# Patient Record
Sex: MALE | Race: ASIAN | HISPANIC OR LATINO | Employment: FULL TIME | URBAN - METROPOLITAN AREA
[De-identification: names, ages, dates, MRNs, and addresses within clinical notes are randomized per-mention and may not be internally consistent; named-entity substitution may affect disease eponyms.]

---

## 2017-03-20 ENCOUNTER — TELEPHONE (OUTPATIENT)
Dept: FAMILY MEDICINE | Facility: CLINIC | Age: 42
End: 2017-03-20

## 2017-03-20 ENCOUNTER — DOCUMENTATION ONLY (OUTPATIENT)
Dept: FAMILY MEDICINE | Facility: CLINIC | Age: 42
End: 2017-03-20

## 2017-03-20 NOTE — TELEPHONE ENCOUNTER
Left message that provider is out sick and scheduled appointment will need to be rescheduled. Ask for call back.

## 2017-03-20 NOTE — PROGRESS NOTES
Pre-Visit Chart Review  For Appointment Scheduled on 03/21/2017      Health Maintenance Due   Topic Date Due    TETANUS VACCINE  05/18/1993    Influenza Vaccine  08/01/2016    Lipid Panel  03/10/2017

## 2017-03-21 ENCOUNTER — OFFICE VISIT (OUTPATIENT)
Dept: FAMILY MEDICINE | Facility: CLINIC | Age: 42
End: 2017-03-21
Payer: COMMERCIAL

## 2017-03-21 ENCOUNTER — LAB VISIT (OUTPATIENT)
Dept: LAB | Facility: HOSPITAL | Age: 42
End: 2017-03-21
Attending: FAMILY MEDICINE
Payer: COMMERCIAL

## 2017-03-21 VITALS
WEIGHT: 166.25 LBS | HEART RATE: 71 BPM | DIASTOLIC BLOOD PRESSURE: 77 MMHG | HEIGHT: 71 IN | SYSTOLIC BLOOD PRESSURE: 123 MMHG | TEMPERATURE: 98 F | BODY MASS INDEX: 23.27 KG/M2

## 2017-03-21 DIAGNOSIS — R76.8 HEPATITIS B SURFACE ANTIGEN POSITIVE: Primary | ICD-10-CM

## 2017-03-21 DIAGNOSIS — R76.8 HEPATITIS B SURFACE ANTIGEN POSITIVE: ICD-10-CM

## 2017-03-21 LAB
ALBUMIN SERPL BCP-MCNC: 4.3 G/DL
ALP SERPL-CCNC: 65 U/L
ALT SERPL W/O P-5'-P-CCNC: 23 U/L
ANION GAP SERPL CALC-SCNC: 8 MMOL/L
AST SERPL-CCNC: 23 U/L
BASOPHILS # BLD AUTO: 0.01 K/UL
BASOPHILS NFR BLD: 0.2 %
BILIRUB DIRECT SERPL-MCNC: 0.3 MG/DL
BILIRUB SERPL-MCNC: 1 MG/DL
BUN SERPL-MCNC: 12 MG/DL
CALCIUM SERPL-MCNC: 9.4 MG/DL
CHLORIDE SERPL-SCNC: 105 MMOL/L
CHOLEST/HDLC SERPL: 4.6 {RATIO}
CO2 SERPL-SCNC: 27 MMOL/L
CREAT SERPL-MCNC: 0.9 MG/DL
DIFFERENTIAL METHOD: ABNORMAL
EOSINOPHIL # BLD AUTO: 0 K/UL
EOSINOPHIL NFR BLD: 0.2 %
ERYTHROCYTE [DISTWIDTH] IN BLOOD BY AUTOMATED COUNT: 12.8 %
EST. GFR  (AFRICAN AMERICAN): >60 ML/MIN/1.73 M^2
EST. GFR  (NON AFRICAN AMERICAN): >60 ML/MIN/1.73 M^2
GLUCOSE SERPL-MCNC: 109 MG/DL
HCT VFR BLD AUTO: 41.5 %
HDL/CHOLESTEROL RATIO: 21.9 %
HDLC SERPL-MCNC: 210 MG/DL
HDLC SERPL-MCNC: 46 MG/DL
HGB BLD-MCNC: 13.9 G/DL
HIV 1+2 AB+HIV1 P24 AG SERPL QL IA: NEGATIVE
LDLC SERPL CALC-MCNC: 144.2 MG/DL
LYMPHOCYTES # BLD AUTO: 1.3 K/UL
LYMPHOCYTES NFR BLD: 26.6 %
MCH RBC QN AUTO: 29.3 PG
MCHC RBC AUTO-ENTMCNC: 33.5 %
MCV RBC AUTO: 88 FL
MONOCYTES # BLD AUTO: 0.3 K/UL
MONOCYTES NFR BLD: 6.1 %
NEUTROPHILS # BLD AUTO: 3.3 K/UL
NEUTROPHILS NFR BLD: 66.9 %
NONHDLC SERPL-MCNC: 164 MG/DL
PLATELET # BLD AUTO: 232 K/UL
PMV BLD AUTO: 9.3 FL
POTASSIUM SERPL-SCNC: 4.3 MMOL/L
PROT SERPL-MCNC: 7.7 G/DL
RBC # BLD AUTO: 4.74 M/UL
SODIUM SERPL-SCNC: 140 MMOL/L
TRIGL SERPL-MCNC: 99 MG/DL
WBC # BLD AUTO: 4.93 K/UL

## 2017-03-21 PROCEDURE — 86592 SYPHILIS TEST NON-TREP QUAL: CPT

## 2017-03-21 PROCEDURE — 86382 NEUTRALIZATION TEST VIRAL: CPT

## 2017-03-21 PROCEDURE — 36415 COLL VENOUS BLD VENIPUNCTURE: CPT | Mod: PO

## 2017-03-21 PROCEDURE — 86703 HIV-1/HIV-2 1 RESULT ANTBDY: CPT

## 2017-03-21 PROCEDURE — 80074 ACUTE HEPATITIS PANEL: CPT

## 2017-03-21 PROCEDURE — 85025 COMPLETE CBC W/AUTO DIFF WBC: CPT

## 2017-03-21 PROCEDURE — 80061 LIPID PANEL: CPT

## 2017-03-21 PROCEDURE — 99999 PR PBB SHADOW E&M-EST. PATIENT-LVL III: CPT | Mod: PBBFAC,,, | Performed by: PHYSICIAN ASSISTANT

## 2017-03-21 PROCEDURE — 80048 BASIC METABOLIC PNL TOTAL CA: CPT

## 2017-03-21 PROCEDURE — 99203 OFFICE O/P NEW LOW 30 MIN: CPT | Mod: S$GLB,,, | Performed by: PHYSICIAN ASSISTANT

## 2017-03-21 PROCEDURE — 80076 HEPATIC FUNCTION PANEL: CPT

## 2017-03-21 PROCEDURE — 1160F RVW MEDS BY RX/DR IN RCRD: CPT | Mod: S$GLB,,, | Performed by: PHYSICIAN ASSISTANT

## 2017-03-21 NOTE — PROGRESS NOTES
Subjective:       Patient ID: Mouna Hogan is a 41 y.o. male.    Chief Complaint: Other    HPI Comments: Patient presents for evaluation of recent lab done during a blood donation.  He donated blood in 1/2017.  He received a letter in mail stating screening blood work was positive of hepatitis B surface antigen.  He is unaware of any potential recent exposure to hepatitis B.  He had a blood transfusion about 4-5 years ago after he became acutely anemic from a bleeding stomach ulcer.  He denies IV drug use.  He does not have high risk sexual behavior. He has no chronic medical conditions.  He is feeling well.  He was bone in [facility] but moved to the [place] an infant.      Review of Systems   Constitutional: Negative for appetite change, chills, diaphoresis, fatigue, fever and unexpected weight change.   Cardiovascular: Negative for chest pain and palpitations.   Gastrointestinal: Negative for abdominal distention, abdominal pain, anal bleeding, blood in stool, constipation, diarrhea and vomiting.   Genitourinary: Negative for decreased urine volume and difficulty urinating.   Musculoskeletal: Negative for arthralgias and myalgias.   Skin: Negative for color change, pallor and rash.   Neurological: Negative for dizziness, weakness, light-headedness and headaches.       Objective:      Physical Exam   Constitutional: He appears well-developed and well-nourished. He is cooperative. No distress.   HENT:   Head: Normocephalic and atraumatic.   Mouth/Throat: Oropharynx is clear and moist and mucous membranes are normal.   Eyes: Conjunctivae are normal. No scleral icterus.   Neck: Carotid bruit is not present.   Cardiovascular: Normal rate, regular rhythm and normal heart sounds.    Pulmonary/Chest: Effort normal and breath sounds normal.   Abdominal: Soft. Bowel sounds are normal. There is no tenderness.   Musculoskeletal:        Right lower leg: He exhibits no edema.        Left lower leg: He exhibits no edema.    Neurological: He is alert.   Skin: Skin is warm and dry.   Vitals reviewed.      Assessment:       1. Hepatitis B surface antigen positive        Plan:     Mouna was seen today for other.    Diagnoses and all orders for this visit:    Hepatitis B surface antigen positive  -     HEPATITIS PANEL, ACUTE; Future  -     Hepatitis B surface antigen; Future  -     Basic metabolic panel; Future  -     Hepatic function panel; Future  -     Lipid panel; Future  -     HIV-1 and HIV-2 antibodies; Future  -     Hepatitis C antibody; Future  -     CBC auto differential; Future  -     RPR; Future  -     HEPATITIS B CORE ANTIBODY, IGM; Future    Further recommendations will be made based on results   likely refer to hepatology

## 2017-03-21 NOTE — MR AVS SNAPSHOT
"    [facility]  [address] [address]  Phone: [phone]  Fax: [phone]                  Mouna Hogan   3/21/2017 8:00 AM   Office Visit    Description:  Male : 1975   Provider:  CHONG Castro   Department:  [place]           Reason for Visit     Other           Diagnoses this Visit        Comments    Hepatitis B surface antigen positive    -  Primary            To Do List           Future Appointments        Provider Department Dept Phone    2017 3:40 PM Florentin Harman MD [facility] [phone]      Goals (5 Years of Data)     None      [facility]     [facility] Nurse Care Line -  Assistance  Registered nurses in the [facility] Center provide clinical advisement, health education, appointment booking, and other advisory services.  Call for this free service at [phone].             Medications           Message regarding Medications     Verify the changes and/or additions to your medication regime listed below are the same as discussed with your clinician today.  If any of these changes or additions are incorrect, please notify your healthcare provider.             Verify that the below list of medications is an accurate representation of the medications you are currently taking.  If none reported, the list may be blank. If incorrect, please contact your healthcare provider. Carry this list with you in case of emergency.           Current Medications     acetaminophen (TYLENOL EXTRA STRENGTH) 500 MG tablet PRN           Clinical Reference Information           Your Vitals Were     BP Pulse Temp Height Weight BMI    123/77 (BP Location: Right arm, Patient Position: Sitting, BP Method: Automatic) 71 98.3 °F (36.8 °C) (Oral) 5' 11" (1.803 m) 75.4 kg (166 lb 3.6 oz) 23.18 kg/m2      Blood Pressure          Most Recent Value    BP  123/77      Allergies as of 3/21/2017     No Known Drug Allergies    "   Immunizations Administered on Date of Encounter - 3/21/2017     None      Orders Placed During Today's Visit     Future Labs/Procedures Expected by Expires    Basic metabolic panel  3/21/2017 5/20/2018    CBC auto differential  3/21/2017 3/21/2018    Hepatic function panel  3/21/2017 5/20/2018    HEPATITIS B CORE ANTIBODY, IGM  3/21/2017 5/20/2018    Hepatitis B surface antigen  3/21/2017 5/20/2018    Hepatitis C antibody  3/21/2017 5/20/2018    HEPATITIS PANEL, ACUTE  3/21/2017 5/20/2018    HIV-1 and HIV-2 antibodies  3/21/2017 5/20/2018    Lipid panel  3/21/2017 5/20/2018    RPR  3/21/2017 5/20/2018      Language Assistance Services     ATTENTION: Language assistance services are available, free of charge. Please call [phone].      ATENCIÓN: Si habla español, tiene a henry disposición servicios gratuitos de asistencia lingüística. Llame al [phone].     CHÚ Ý: N?u b?n nói Ti?ng Vi?t, có các d?ch v? h? tr? ngôn ng? mi?n phí dành cho b?n. G?i s? [phone].         [facility] complies with applicable Federal civil rights laws and does not discriminate on the basis of race, color, national origin, age, disability, or sex.

## 2017-03-22 ENCOUNTER — TELEPHONE (OUTPATIENT)
Dept: FAMILY MEDICINE | Facility: CLINIC | Age: 42
End: 2017-03-22

## 2017-03-22 DIAGNOSIS — R76.8 HEPATITIS B SURFACE ANTIGEN POSITIVE: Primary | ICD-10-CM

## 2017-03-22 LAB
HAV IGM SERPL QL IA: NEGATIVE
HBSAG CONFIRMATION: POSITIVE
HBV CORE IGM SERPL QL IA: NEGATIVE
HBV CORE IGM SERPL QL IA: NEGATIVE
HBV SURFACE AG SERPL QL IA: POSITIVE
HBV SURFACE AG SERPL QL IA: POSITIVE
HCV AB SERPL QL IA: NEGATIVE
HCV AB SERPL QL IA: NEGATIVE
RPR SER QL: NORMAL

## 2017-03-22 NOTE — TELEPHONE ENCOUNTER
Please let patient know that all lab is in acceptable range except the hepatitis B surface antigen is again positive   You can let him know that the antibody level is not elevated indicating probable chronic hep b infection   i am referring him to hepatology for further evaluation

## 2017-03-23 ENCOUNTER — DOCUMENTATION ONLY (OUTPATIENT)
Dept: TRANSPLANT | Facility: CLINIC | Age: 42
End: 2017-03-23

## 2017-03-23 NOTE — NURSING
Pt records reviewed.   Pt will be referred to Hepatology.  Hepatitis B surface antigen positive  Initial referral received  from the [facility].   Referring Provider/diagnosis  CHONG Castro  Referral letter sent to provider and patient.

## 2017-03-23 NOTE — LETTER
March 23, 2017    CHONG Castro  [address] [facility] [address]      Dear Dr. Anna    Patient: Mouna Hogan   MR Number: [id number]   YOB: 1975     Thank you for the referral of Mouna Hogan to the [facility]. An initial appointment will be scheduled for your patient with one of our Hepatologists.      Thank you again for your trust in our program.  If there is anything we can do for you or your staff, please feel free to contact us.        Sincerely,        [facility]  [address] [place] [address]  [phone]

## 2017-03-23 NOTE — TELEPHONE ENCOUNTER
MA spoke with pt's wife, requesting appt in [place] or [place]. Explained to wife no available appt. She stated her  is not having symptoms so they're willing to wait. Pt name put on waiting list for main campus also.

## 2017-03-23 NOTE — LETTER
March 23, 2017    Mouna Hogan  [address]  [place] [address]      Dear Mouna Hogan:    Your doctor has referred you to the [facility]. You will be contacted by our office and an initial appointment will then be scheduled for you.    We look forward to seeing you soon. If you have any further questions, please contact us at [phone].       Sincerely,        [facility]   [facility] [place] [address]  [phone]

## 2017-03-23 NOTE — TELEPHONE ENCOUNTER
Please schedule appointment on [facility] for patient diagnosed with Hepatits B. He has been notified and is waiting for call.

## 2017-03-23 NOTE — TELEPHONE ENCOUNTER
----- Message from Elida Shah sent at 3/23/2017  8:37 AM CDT -----  Contact: pt, # [phone]  Returning call, Results  Call back on #[phone]  thanks

## 2017-03-27 ENCOUNTER — TELEPHONE (OUTPATIENT)
Dept: HEPATOLOGY | Facility: CLINIC | Age: 42
End: 2017-03-27

## 2017-03-27 NOTE — TELEPHONE ENCOUNTER
----- Message from Sapphire Nunez sent at 3/23/2017  1:00 PM CDT -----  Want's Dr. Alvarado, N/S only, states someone has already spoken with him about this appt.  ----- Message -----     From: Aliza Royal LPN     Sent: 3/23/2017   7:54 AM       To: Hepatology Scheduling    Pt records reviewed.   Pt will be referred to Hepatology.  Hepatitis B surface antigen positive  [facility]   Referring Provider/diagnosis  CHONG Castro  Referral letter sent to provider and patient.

## 2017-03-28 ENCOUNTER — TELEPHONE (OUTPATIENT)
Dept: HEPATOLOGY | Facility: CLINIC | Age: 42
End: 2017-03-28

## 2017-03-28 NOTE — TELEPHONE ENCOUNTER
MA spoke with patient, name on waiting list waiting for schedule to open. Pt verbalized understanding.

## 2017-03-28 NOTE — TELEPHONE ENCOUNTER
----- Message from Janiya Philippe sent at 3/28/2017  9:14 AM CDT -----  Contact: [place]rum [place]  Message     Appointment Request From: Mouna Hogan        With Provider: Other - (see comments) [oth]        Would Accept With:Only the person I've selected        Preferred Date Range: From 5/8/2017 To 5/31/2017        Preferred Times: Any        Reason for visit: Request an Appt        Comments:    Hi I have a referral to the liver department.  Can I see Dr Alvarado at the Main [place] when her May schedules are available?  Thank you.  You can contact me at [phone] or cell [phone].        Mouna Hogan

## 2017-04-10 ENCOUNTER — TELEPHONE (OUTPATIENT)
Dept: HEPATOLOGY | Facility: CLINIC | Age: 42
End: 2017-04-10

## 2017-04-10 NOTE — TELEPHONE ENCOUNTER
Schedule open for MAY (waiting list).     MA spoke with pt's wife, Appt schedule 05/23 @ 1020am. Patient address verified, appt letter mailed to patient.

## 2017-05-12 ENCOUNTER — TELEPHONE (OUTPATIENT)
Dept: FAMILY MEDICINE | Facility: CLINIC | Age: 42
End: 2017-05-12

## 2017-05-12 NOTE — TELEPHONE ENCOUNTER
Left message that provider will be out of the clinic on day of appointment and will need to call back to reschedule appointment

## 2017-05-23 ENCOUNTER — OFFICE VISIT (OUTPATIENT)
Dept: HEPATOLOGY | Facility: CLINIC | Age: 42
End: 2017-05-23
Payer: COMMERCIAL

## 2017-05-23 ENCOUNTER — HOSPITAL ENCOUNTER (OUTPATIENT)
Dept: RADIOLOGY | Facility: CLINIC | Age: 42
Discharge: HOME | End: 2017-05-23
Attending: INTERNAL MEDICINE
Payer: COMMERCIAL

## 2017-05-23 ENCOUNTER — LAB VISIT (OUTPATIENT)
Dept: LAB | Facility: HOSPITAL | Age: 42
End: 2017-05-23
Attending: INTERNAL MEDICINE
Payer: COMMERCIAL

## 2017-05-23 VITALS
SYSTOLIC BLOOD PRESSURE: 128 MMHG | WEIGHT: 165.38 LBS | HEIGHT: 71 IN | OXYGEN SATURATION: 99 % | DIASTOLIC BLOOD PRESSURE: 88 MMHG | RESPIRATION RATE: 18 BRPM | TEMPERATURE: 98 F | HEART RATE: 66 BPM | BODY MASS INDEX: 23.15 KG/M2

## 2017-05-23 DIAGNOSIS — B18.1 CHRONIC HEPATITIS B: ICD-10-CM

## 2017-05-23 LAB
AFP SERPL-MCNC: 3.4 NG/ML
HBV CORE AB SERPL QL IA: POSITIVE
HEPATITIS A ANTIBODY, IGG: POSITIVE

## 2017-05-23 PROCEDURE — 86707 HEPATITIS BE ANTIBODY: CPT

## 2017-05-23 PROCEDURE — 36415 COLL VENOUS BLD VENIPUNCTURE: CPT

## 2017-05-23 PROCEDURE — 76700 US EXAM ABDOM COMPLETE: CPT | Mod: 26,,, | Performed by: RADIOLOGY

## 2017-05-23 PROCEDURE — 86704 HEP B CORE ANTIBODY TOTAL: CPT

## 2017-05-23 PROCEDURE — 86790 VIRUS ANTIBODY NOS: CPT

## 2017-05-23 PROCEDURE — 82105 ALPHA-FETOPROTEIN SERUM: CPT

## 2017-05-23 PROCEDURE — 99244 OFF/OP CNSLTJ NEW/EST MOD 40: CPT | Mod: S$GLB,,, | Performed by: INTERNAL MEDICINE

## 2017-05-23 PROCEDURE — 99999 PR PBB SHADOW E&M-EST. PATIENT-LVL III: CPT | Mod: PBBFAC,,, | Performed by: INTERNAL MEDICINE

## 2017-05-23 PROCEDURE — 76700 US EXAM ABDOM COMPLETE: CPT | Mod: TC,PO

## 2017-05-23 PROCEDURE — 87517 HEPATITIS B DNA QUANT: CPT

## 2017-05-23 PROCEDURE — 87350 HEPATITIS BE AG IA: CPT

## 2017-05-23 NOTE — Clinical Note
Recommendations: -  Labs today:   HAVAb, HBcAb total, HBV DNA PCR quant, HBeAg, HBeAb  AFP today and every year.  -  U/s abd now and every year -  Return in 3 months

## 2017-05-23 NOTE — LETTER
May 23, 2017      Florentin Harman MD  [address]  [facility] [address]           [facility]  [address]  [facility] [address]  Phone: [phone]  Fax: [phone]          Patient: Mouna Hogan   MR Number: [id number]   YOB: 1975   Date of Visit: 5/23/2017       Dear Dr. Florentin Harman:    Thank you for referring Mouna Hogan to me for evaluation. Attached you will find relevant portions of my assessment and plan of care.    If you have questions, please do not hesitate to call me. I look forward to following Mouna Hogan along with you.    Sincerely,    Griselda Alvarado MD    Enclosure  CC:  No Recipients    If you would like to receive this communication electronically, please contact [email or web address] or [phone] to request more information on [facility] [employer] access.    For providers and/or their staff who would like to refer a patient to [facility], please contact us through our one-stop-shop provider referral line, [facility], at [phone].    If you feel you have received this communication in error or would no longer like to receive these types of communications, please e-mail [email or web address]

## 2017-05-23 NOTE — PROGRESS NOTES
[facility] Consultation Note    Reason for Consult:  The encounter diagnosis was Chronic hepatitis B.    PCP: Angela Anna   [address] [place] [address]    HPI:  This is a 42 y.o. male here for evaluation of:  HBsAg positive, HBcore Ab negative. No HBV DNA available. Transaminases normal 23 each, all LFTs normal. Creatinine normal.  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      Just found out of HBsAg positive when donated blood in approx. 2017.  Had HBcAb IgM negative, HAVAb IgM neg, HCV Ab negative.      Patient does not know if mom has hep B, dad is  from brain aneurysm.  Has 3 sisters and 2 brothers from the same mother, not known if any of them have hep B positive result.      Completely asymptomatic.     Elevated liver enzymes: No  Abnormal imaging: None available  Cirrhosis: No  Hepatitis C: No  Hepatitis B: Yes  Fatty liver: No  Encephalopathy: No  Post-hospital discharge: No  Symptoms: none    Primary hepatic manifestations:  Fatigue:No  Edema:No  Ascites:No  Encephalopathy:No  Abdominal pain:No  GI bleeds: No  Pruritus:No  Weight Changes:No  Changes in Bowel habits: No  Muscle cramps:No    Risk factors for liver disease:  No jaundice  No transfusions  No IVDU  Did not snort cocaine or similar agents  Did not live with anyone with hepatitis B or C  Sexual partner not tested  No hepatotoxic medications  No exposure to industrial toxins  Alcohol: None      ROS:  Constitutional: No fevers, chills, weight changes, fatigue  ENT: No allergies, nosebleeds,   CV: No chest pain  Pulm: No cough, shortness of breath  Ophtho: No vision changes  GI/Liver: see HPI  Derm: No rash, itching  Heme: No swollen glands, bruising  MSK: No joint pains, joint swelling  : No dysuria, hematuria, decrease in urine output  Endo: No hot or cold intolerance  Neuro: No confusion, disorientation, difficulty with sleep, memory, concentration, syncope, seizure  Psych:  "No anxiety, depression    Medical History:  has a past medical history of H pylori ulcer.    Surgical History:  has a past surgical history that includes Lymph node dissection (1997).    Family History: family history is not on file..     Social History:  reports that he has never smoked. He has never used smokeless tobacco. He reports that he drinks alcohol. He reports that he does not use drugs.    Review of patient's allergies indicates:   Allergen Reactions    No known drug allergies        Current Outpatient Prescriptions   Medication Sig    acetaminophen (TYLENOL EXTRA STRENGTH) 500 MG tablet PRN     No current facility-administered medications for this visit.        Objective Findings:    Vital Signs:  /88 (BP Location: Left arm, Patient Position: Sitting)   Pulse 66   Temp 98 °F (36.7 °C) (Oral)   Resp 18   Ht 5' 11" (1.803 m)   Wt 75 kg (165 lb 5.5 oz)   SpO2 99%   BMI 23.06 kg/m²   Body mass index is 23.06 kg/m².    Physical Exam:  General Appearance: Well appearing in no acute distress  Head:   Normocephalic, without obvious abnormality  Eyes:    No scleral icterus, EOMI  ENT: Neck supple, Lips, mucosa, and tongue normal; teeth and gums normal  Lungs: CTA bilaterally in anterior and posterior fields, no wheezes, no crackles.  Heart:  Regular rate and rhythm, S1, S2 normal, no murmurs heard  Abdomen: Soft, non tender, non distended with positive bowel sounds in all four quadrants. No hepatosplenomegaly, ascites, or mass  Extremities: 2+ pulses, no clubbing, cyanosis or edema  Skin: No rash  Neurologic: CN II-XII intact, alert, oriented x 3. No asterixis      Labs:  Lab Results   Component Value Date    WBC 4.93 03/21/2017    HGB 13.9 (L) 03/21/2017    HCT 41.5 03/21/2017     03/21/2017    CHOL 210 (H) 03/21/2017    TRIG 99 03/21/2017    HDL 46 03/21/2017    CREATININE 0.9 03/21/2017    BUN 12 03/21/2017    BILITOT 1.0 03/21/2017    ALT 23 03/21/2017    AST 23 03/21/2017    ALKPHOS 65 " 03/21/2017     03/21/2017    K 4.3 03/21/2017     03/21/2017    CO2 27 03/21/2017    TSH 1.00 10/11/2010       Imaging:   none    Endoscopy:    None    Assessment:  1. Chronic hepatitis B    HBsAg positive, need HBV DNA and e antigen status checked        Recommendations:  -  Labs today:    HAVAb, HBcAb total, HBV DNA PCR quant, HBeAg, HBeAb   AFP today and every year.   -  U/s abd now and every year  -  Return in 3 months      Return in about 3 months (around 8/23/2017).      Order summary:  Orders Placed This Encounter   Procedures    US Abdomen Complete    AFP tumor marker    Hepatitis B core antibody, total    Hepatitis B DNA, Quant    Hepatitis B e antigen    Hepatitis B e antibody    Hepatitis A antibody, IgG       Thank you so much for allowing me to participate in the care of Mouna Samira Alvarado MD

## 2017-05-26 ENCOUNTER — TELEPHONE (OUTPATIENT)
Dept: HEPATOLOGY | Facility: CLINIC | Age: 42
End: 2017-05-26

## 2017-05-26 ENCOUNTER — PATIENT MESSAGE (OUTPATIENT)
Dept: TRANSPLANT | Facility: CLINIC | Age: 42
End: 2017-05-26

## 2017-05-26 DIAGNOSIS — R16.0 LIVER MASS: ICD-10-CM

## 2017-05-26 LAB
HBV DNA SERPL NAA+PROBE-ACNC: 3.36 LOGIU/ML
HBV DNA SERPL NAA+PROBE-LOG IU: 4340 IU/ML
HBV E AB SER QL: ABNORMAL
HBV E AG SPEC QL: NORMAL

## 2017-05-26 NOTE — TELEPHONE ENCOUNTER
Pl inform patient ultrasound showed a mass in the liver 3.3 cm in size, needs further evaluation with MRI.  Tumor marker was normal on 5/23/17.  Order placed, pl schedule ASAP. Thanks

## 2017-05-26 NOTE — TELEPHONE ENCOUNTER
MA called patient, able to speak to his wife inform wife of patient US results, she schedule his MRI in [place] to further evaluate his liver wife accepted 5/29/17 mailed appt reminder to patient.KEYANNA

## 2017-05-26 NOTE — TELEPHONE ENCOUNTER
----- Message from Griselda Alvarado MD sent at 5/26/2017  7:11 AM CDT -----  Immune to hep A, therefore, does not need immunization against hep A. Tumor marker is normal, but ultrasound showed a mass in the liver, therefore, will need MRI for evaluation. Order placed, pl schedule.

## 2017-05-29 ENCOUNTER — HOSPITAL ENCOUNTER (OUTPATIENT)
Dept: RADIOLOGY | Facility: HOSPITAL | Age: 42
Discharge: HOME | End: 2017-05-29
Attending: INTERNAL MEDICINE
Payer: COMMERCIAL

## 2017-05-29 ENCOUNTER — PATIENT MESSAGE (OUTPATIENT)
Dept: TRANSPLANT | Facility: CLINIC | Age: 42
End: 2017-05-29

## 2017-05-29 DIAGNOSIS — R16.0 LIVER MASS: ICD-10-CM

## 2017-05-29 DIAGNOSIS — B18.1 CHRONIC HEPATITIS B: Primary | ICD-10-CM

## 2017-05-29 PROCEDURE — A9585 GADOBUTROL INJECTION: HCPCS | Performed by: INTERNAL MEDICINE

## 2017-05-29 PROCEDURE — 74183 MRI ABD W/O CNTR FLWD CNTR: CPT | Mod: TC

## 2017-05-29 PROCEDURE — 74183 MRI ABD W/O CNTR FLWD CNTR: CPT | Mod: 26,,, | Performed by: RADIOLOGY

## 2017-05-29 PROCEDURE — 25500020 PHARM REV CODE 255: Performed by: INTERNAL MEDICINE

## 2017-05-29 RX ORDER — GADOBUTROL 604.72 MG/ML
7 INJECTION INTRAVENOUS
Status: COMPLETED | OUTPATIENT
Start: 2017-05-29 | End: 2017-05-29

## 2017-05-29 RX ORDER — GADOBUTROL 604.72 MG/ML
INJECTION INTRAVENOUS
Status: DISPENSED
Start: 2017-05-29 | End: 2017-05-29

## 2017-05-29 RX ADMIN — GADOBUTROL 7 ML: 604.72 INJECTION INTRAVENOUS at 07:05

## 2017-06-30 ENCOUNTER — DOCUMENTATION ONLY (OUTPATIENT)
Dept: FAMILY MEDICINE | Facility: CLINIC | Age: 42
End: 2017-06-30

## 2017-06-30 NOTE — PROGRESS NOTES
Pre-Visit Chart Review  For Appointment Scheduled on 07/03/2017    Health Maintenance Due   Topic Date Due    TETANUS VACCINE  05/18/1993

## 2017-07-03 ENCOUNTER — LAB VISIT (OUTPATIENT)
Dept: LAB | Facility: HOSPITAL | Age: 42
End: 2017-07-03
Attending: FAMILY MEDICINE
Payer: COMMERCIAL

## 2017-07-03 ENCOUNTER — OFFICE VISIT (OUTPATIENT)
Dept: FAMILY MEDICINE | Facility: CLINIC | Age: 42
End: 2017-07-03
Payer: COMMERCIAL

## 2017-07-03 VITALS
BODY MASS INDEX: 23.52 KG/M2 | HEART RATE: 91 BPM | HEIGHT: 71 IN | SYSTOLIC BLOOD PRESSURE: 136 MMHG | TEMPERATURE: 98 F | DIASTOLIC BLOOD PRESSURE: 73 MMHG | WEIGHT: 168 LBS

## 2017-07-03 DIAGNOSIS — Z23 IMMUNIZATION DUE: ICD-10-CM

## 2017-07-03 DIAGNOSIS — B18.1 CHRONIC HEPATITIS B: ICD-10-CM

## 2017-07-03 DIAGNOSIS — B18.1 CHRONIC HEPATITIS B: Primary | ICD-10-CM

## 2017-07-03 DIAGNOSIS — Z29.9 PREVENTIVE MEASURE: ICD-10-CM

## 2017-07-03 LAB
25(OH)D3+25(OH)D2 SERPL-MCNC: 38 NG/ML
ALBUMIN SERPL BCP-MCNC: 4 G/DL
ALP SERPL-CCNC: 74 U/L
ALT SERPL W/O P-5'-P-CCNC: 30 U/L
ANION GAP SERPL CALC-SCNC: 6 MMOL/L
AST SERPL-CCNC: 21 U/L
BILIRUB SERPL-MCNC: 0.5 MG/DL
BUN SERPL-MCNC: 15 MG/DL
CALCIUM SERPL-MCNC: 9.5 MG/DL
CHLORIDE SERPL-SCNC: 104 MMOL/L
CO2 SERPL-SCNC: 29 MMOL/L
CREAT SERPL-MCNC: 0.9 MG/DL
EST. GFR  (AFRICAN AMERICAN): >60 ML/MIN/1.73 M^2
EST. GFR  (NON AFRICAN AMERICAN): >60 ML/MIN/1.73 M^2
ESTIMATED AVG GLUCOSE: 114 MG/DL
GLUCOSE SERPL-MCNC: 100 MG/DL
HBA1C MFR BLD HPLC: 5.6 %
POTASSIUM SERPL-SCNC: 3.8 MMOL/L
PROT SERPL-MCNC: 7.6 G/DL
SODIUM SERPL-SCNC: 139 MMOL/L
TSH SERPL DL<=0.005 MIU/L-ACNC: 1.06 UIU/ML

## 2017-07-03 PROCEDURE — 99999 PR PBB SHADOW E&M-EST. PATIENT-LVL III: CPT | Mod: PBBFAC,,, | Performed by: FAMILY MEDICINE

## 2017-07-03 PROCEDURE — 90471 IMMUNIZATION ADMIN: CPT | Mod: S$GLB,,, | Performed by: FAMILY MEDICINE

## 2017-07-03 PROCEDURE — 83036 HEMOGLOBIN GLYCOSYLATED A1C: CPT

## 2017-07-03 PROCEDURE — 80053 COMPREHEN METABOLIC PANEL: CPT

## 2017-07-03 PROCEDURE — 90715 TDAP VACCINE 7 YRS/> IM: CPT | Mod: S$GLB,,, | Performed by: FAMILY MEDICINE

## 2017-07-03 PROCEDURE — 82306 VITAMIN D 25 HYDROXY: CPT

## 2017-07-03 PROCEDURE — 36415 COLL VENOUS BLD VENIPUNCTURE: CPT | Mod: PO

## 2017-07-03 PROCEDURE — 84443 ASSAY THYROID STIM HORMONE: CPT

## 2017-07-03 PROCEDURE — 99214 OFFICE O/P EST MOD 30 MIN: CPT | Mod: 25,S$GLB,, | Performed by: FAMILY MEDICINE

## 2017-07-03 NOTE — PROGRESS NOTES
Two Patient identifiers used, Name and . VIS information given to patient and procedure was explained. Patient verbalized understanding. Tdap administered IM in the right deltoid using sterile technique.  No residual bleeding noted. Patient tolerated procedure well.

## 2017-07-03 NOTE — PROGRESS NOTES
"[facility]  Progress Note    Subjective:       Patient ID: Mouna Garcia is a 42 y.o. male.    Chief Complaint: Establish Care    HPI42 y.o.male with current medical history of hepatitis B is here today to establish care.  Patient is under care of hepatology.  Patient was found to have hepatitis B when attempting to donate blood.  Patient does not have any other medical problems.  Patient does not have any acute complaints at today's visit.  Review of Systems   Constitutional: Negative for chills and fever.   HENT: Negative for congestion.    Eyes: Negative for pain.   Respiratory: Negative for shortness of breath and wheezing.    Cardiovascular: Negative for chest pain, palpitations and leg swelling.   Gastrointestinal: Negative for abdominal pain, nausea and vomiting.   Genitourinary: Negative for difficulty urinating.   Musculoskeletal: Negative for arthralgias, gait problem and myalgias.   Skin: Negative for rash.   Neurological: Negative for seizures.       Objective:      Vitals:    07/03/17 1302   BP: 136/73   BP Location: Right arm   Patient Position: Sitting   BP Method: Automatic   Pulse: 91   Temp: 98.4 °F (36.9 °C)   TempSrc: Oral   Weight: 76.2 kg (167 lb 15.9 oz)   Height: 5' 11" (1.803 m)     Body mass index is 23.43 kg/m².  Physical Exam   Constitutional: He is oriented to person, place, and time. He appears well-developed and well-nourished.   HENT:   Head: Normocephalic and atraumatic.   Eyes: Conjunctivae and EOM are normal. Pupils are equal, round, and reactive to light.   Neck: Normal range of motion. Neck supple. No JVD present.   Cardiovascular: Normal rate, regular rhythm, normal heart sounds and intact distal pulses.  Exam reveals no gallop and no friction rub.    No murmur heard.  Pulmonary/Chest: Effort normal and breath sounds normal. No respiratory distress. He has no wheezes.   Abdominal: Soft. Bowel sounds are normal. There is no tenderness.   Musculoskeletal: Normal range " of motion.   Neurological: He is alert and oriented to person, place, and time. No cranial nerve deficit.   Skin: Skin is warm and dry.   Psychiatric: He has a normal mood and affect. His behavior is normal. Judgment and thought content normal.   Nursing note and vitals reviewed.      Assessment:       1. Chronic hepatitis B    2. Preventive measure    3. Immunization due        Plan:       Chronic hepatitis B  -     Comprehensive metabolic panel; Future; Expected date: 07/03/2017  -      Follow up with hepatology   -  Avoid hepatotoxic agents     Preventive measure  -     Hemoglobin A1c; Future; Expected date: 07/03/2017  -     Vitamin D; Future; Expected date: 07/03/2017  -     TSH; Future; Expected date: 07/03/2017    Immunization due  -     Tdap Vaccine      Return in about 6 months (around 1/3/2018).  Florentin Harman MD  [facility]  7/3/2017 1:28 PM

## 2017-08-04 ENCOUNTER — PATIENT MESSAGE (OUTPATIENT)
Dept: HEPATOLOGY | Facility: CLINIC | Age: 42
End: 2017-08-04

## 2017-08-04 ENCOUNTER — TELEPHONE (OUTPATIENT)
Dept: HEPATOLOGY | Facility: CLINIC | Age: 42
End: 2017-08-04

## 2017-08-04 NOTE — TELEPHONE ENCOUNTER
----- Message from Angela Collier sent at 8/4/2017  3:12 PM CDT -----  Contact: patient  Patient calling in regards to receiving his recall letter for a 3 mo f/u. He wants to schedule his f/u in [place]. No avail appt. Leno advise.   Call back   Thanks!

## 2017-08-04 NOTE — TELEPHONE ENCOUNTER
Spoke with pt next available in [place] is November 2017.   Pt decline, will wait until November    I offered pt video visit, that he is interested in. I sent pt the consent to sign so that we can schedule the appt.     Pt name also added to wait list for November appt.

## 2017-08-10 ENCOUNTER — PATIENT MESSAGE (OUTPATIENT)
Dept: HEPATOLOGY | Facility: CLINIC | Age: 42
End: 2017-08-10

## 2017-08-11 ENCOUNTER — OFFICE VISIT (OUTPATIENT)
Dept: HEPATOLOGY | Facility: CLINIC | Age: 42
End: 2017-08-11
Payer: COMMERCIAL

## 2017-08-11 DIAGNOSIS — R93.2 ABNORMAL LIVER DIAGNOSTIC IMAGING: Primary | ICD-10-CM

## 2017-08-11 DIAGNOSIS — B18.1 CHRONIC HEPATITIS B: ICD-10-CM

## 2017-08-11 DIAGNOSIS — R16.0 LIVER MASS: Primary | ICD-10-CM

## 2017-08-11 DIAGNOSIS — D18.03 LIVER HEMANGIOMA: ICD-10-CM

## 2017-08-11 PROCEDURE — 99444 PR PHYSICIAN ONLINE EVALUATION & MANAGEMENT SERVICE: CPT | Mod: CSM,,, | Performed by: INTERNAL MEDICINE

## 2017-08-11 PROCEDURE — 99999 PR PBB SHADOW E&M-EST. PATIENT-LVL II: CPT | Mod: PBBFAC,,, | Performed by: INTERNAL MEDICINE

## 2017-08-11 PROCEDURE — 99999 PR PBB SHADOW E&M-EST. PATIENT-LVL III: CPT | Mod: PBBFAC,,, | Performed by: INTERNAL MEDICINE

## 2017-08-11 PROCEDURE — 99499 UNLISTED E&M SERVICE: CPT | Mod: S$GLB,,, | Performed by: INTERNAL MEDICINE

## 2017-08-11 NOTE — Clinical Note
Recommendations: -  AFP, CMP, HBV DNA quant and MRI w/wo contrast at the end Aug 2017 -  AFP, HBV DNA quant and CMP q 6 months -  MRI in Aug 2018 -  Return in 6 months - [facility]

## 2017-08-11 NOTE — PROGRESS NOTES
[facility] Consultation Note    Reason for Consult:  The primary encounter diagnosis was Liver mass. Diagnoses of Liver hemangioma and Chronic hepatitis B were also pertinent to this visit.    PCP: Florentin Harman       Interval history:  Liver mass found on u/s abd and follow-up MRI confirmed mass, as below, during first-time evaluation of chronic hep B.      Us 17:  The liver is normal in size and contour.  Within the left hepatic lobe, likely segment 4B, there is a 3.3 cm mixed echogenicity mass.  No additional hepatic lesions are identified.  The main portal vein demonstrates normal directional flow.    MRI 17:  Posteriorly in the medial segment of the left lobe of the liver corresponding to the ultrasound findings there is 2.7 x 2.7 x 1.9 cm mass typical of a hemangioma.  It's signal is that of blood pool, and there is a bright peripheral nodular enhancement on the early arterial phase.  Liver is otherwise unremarkable in appearance and spleen, pancreas, adrenal glands, kidneys show no abnormality.      AFP 3.4    Chronic hep B is HBeAg negative, HBeAb positive, with HBV DNA of 4,340 IU/mL on 17.  Thus, this is pre-core mutant/core-promoter mutation hep B.  Will need life-long monitoring for flares and HCC.     Patient remains asymptomatic.      HPI:  This is a 42 y.o. male here for evaluation of:  HBsAg positive, HBcore Ab negative. No HBV DNA available. Transaminases normal 23 each, all LFTs normal. Creatinine normal.  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      Just found out of HBsAg positive when donated blood in approx. 2017.  Had HBcAb IgM negative, HAVAb IgM neg, HCV Ab negative.      Patient does not know if mom has hep B, dad is  from brain aneurysm.  Has 3 sisters and 2 brothers from the same mother, not known if any of them have hep B positive result.      Completely asymptomatic.     Elevated liver enzymes: No  Abnormal imaging:  liver mass, consistent with hemangioma.  Cirrhosis: No  Hepatitis C: No  Hepatitis B: Yes  Fatty liver: No  Encephalopathy: No  Post-hospital discharge: No  Symptoms: none    Primary hepatic manifestations:  Fatigue:No  Edema:No  Ascites:No  Encephalopathy:No  Abdominal pain:No  GI bleeds: No  Pruritus:No  Weight Changes:No  Changes in Bowel habits: No  Muscle cramps:No    Risk factors for liver disease:  No jaundice  No transfusions  No IVDU  Did not snort cocaine or similar agents  Did not live with anyone with hepatitis B or C  Sexual partner not tested  No hepatotoxic medications  No exposure to industrial toxins  Alcohol: None      ROS:  Constitutional: No fevers, chills, weight changes, fatigue  ENT: No allergies, nosebleeds,   CV: No chest pain  Pulm: No cough, shortness of breath  Ophtho: No vision changes  GI/Liver: see HPI  Derm: No rash, itching  Heme: No swollen glands, bruising  MSK: No joint pains, joint swelling  : No dysuria, hematuria, decrease in urine output  Endo: No hot or cold intolerance  Neuro: No confusion, disorientation, difficulty with sleep, memory, concentration, syncope, seizure  Psych: No anxiety, depression    Medical History:  has a past medical history of H pylori ulcer.    Surgical History:  has a past surgical history that includes Lymph node dissection (1997).    Family History: family history is not on file..     Social History:  reports that he has never smoked. He has never used smokeless tobacco. He reports that he drinks alcohol. He reports that he does not use drugs.    Review of patient's allergies indicates:   Allergen Reactions    No known drug allergies        Current Outpatient Prescriptions   Medication Sig    acetaminophen (TYLENOL EXTRA STRENGTH) 500 MG tablet PRN    MULTIVITAMIN ORAL Take by mouth Daily.     No current facility-administered medications for this visit.        Objective Findings:    Vital Signs: due to Video Visit, no vitals can be taken for  this visit.   There were no vitals taken for this visit.  There is no height or weight on file to calculate BMI.    Physical Exam:  General Appearance: Well appearing in no acute distress  Head:   Normocephalic, without obvious abnormality  Eyes:    No scleral icterus, EOMI  ENT: not done due to video visit.   Lungs: not done due to video visit.  Heart:  not done due to video visit.  Abdomen: Based on patient's self exam, abd is Soft, non tender, non distended.   Extremities: no cyanosis, and (based on patient's self-exam) no edema  Skin: No rash  Neurologic: alert, oriented x 3. No asterixis      Labs:  Lab Results   Component Value Date    WBC 4.93 03/21/2017    HGB 13.9 (L) 03/21/2017    HCT 41.5 03/21/2017     03/21/2017    CHOL 210 (H) 03/21/2017    TRIG 99 03/21/2017    HDL 46 03/21/2017    CREATININE 0.9 07/03/2017    BUN 15 07/03/2017    BILITOT 0.5 07/03/2017    ALT 30 07/03/2017    AST 21 07/03/2017    ALKPHOS 74 07/03/2017     07/03/2017    K 3.8 07/03/2017     07/03/2017    CO2 29 07/03/2017    TSH 1.060 07/03/2017    HGBA1C 5.6 07/03/2017    AFP 3.4 05/23/2017       Imaging:   Us 5/23/17:  The liver is normal in size and contour.  Within the left hepatic lobe, likely segment 4B, there is a 3.3 cm mixed echogenicity mass.  No additional hepatic lesions are identified.  The main portal vein demonstrates normal directional flow.    MRI 5/29/17:  Posteriorly in the medial segment of the left lobe of the liver corresponding to the ultrasound findings there is 2.7 x 2.7 x 1.9 cm mass typical of a hemangioma.  It's signal is that of blood pool, and there is a bright peripheral nodular enhancement on the early arterial phase.  Liver is otherwise unremarkable in appearance and spleen, pancreas, adrenal glands, kidneys show no abnormality.      Endoscopy:    None    Assessment:  1. Liver mass    2. Liver hemangioma    3. Chronic hepatitis B       -  Liver lesion left lobe, 2.7 x 2.7 x 1.9 cm  mass typical of a hemangioma.  Because this mass small in size, it is most likely benign.  Will monitor with MRI at 3 months (now) from initial diagnosis in May 2017.  Then 1 yr later, then if stable, every 2-3 yrs.     -  Chronic hep B is HBeAg negative, HBeAb positive, with HBV DNA of 4,340 IU/mL on 5/23/17.  Thus, this is pre-core mutant/core-promoter mutation hep B.  Currently, although HBV DNA >2000 IU/mL, transaminases completely normal, therefore, will not start anti-hep B antiviral therapy.  But will do life-long monitoring for flares of hepatitis with CMP and HBV DNA q 6 months.     -  HCC Surveillance:  Due to chronic hep B and being male of  origin, will need life-long monitoring for HCC, starting age 40, currently 42.  Will get AFP every 6 months and either U/S or MRI every year.         Recommendations:  -  AFP, CMP, HBV DNA quant and MRI w/wo contrast at the end Aug 2017  -  AFP, HBV DNA quant and CMP q 6 months  -  MRI in Aug 2018  -  Return in 6 months - VidyoVisit    Return in about 6 months (around 2/11/2018), or Video Visit next time. Patient requested it.  .      Order summary:  Orders Placed This Encounter   Procedures    US Abdomen Limited    MRI Abdomen W WO Contrast    Comprehensive metabolic panel    AFP tumor marker    Hepatitis B DNA, Quant       Thank you so much for allowing me to participate in the care of Mouna Alysha Alvarado MD

## 2017-08-12 ENCOUNTER — PATIENT MESSAGE (OUTPATIENT)
Dept: HEPATOLOGY | Facility: CLINIC | Age: 42
End: 2017-08-12

## 2017-08-14 ENCOUNTER — PATIENT MESSAGE (OUTPATIENT)
Dept: HEPATOLOGY | Facility: CLINIC | Age: 42
End: 2017-08-14

## 2017-09-11 ENCOUNTER — LAB VISIT (OUTPATIENT)
Dept: LAB | Facility: HOSPITAL | Age: 42
End: 2017-09-11
Attending: INTERNAL MEDICINE
Payer: COMMERCIAL

## 2017-09-11 DIAGNOSIS — D18.03 LIVER HEMANGIOMA: ICD-10-CM

## 2017-09-11 DIAGNOSIS — R16.0 LIVER MASS: ICD-10-CM

## 2017-09-11 DIAGNOSIS — B18.1 CHRONIC HEPATITIS B: ICD-10-CM

## 2017-09-11 PROCEDURE — 82105 ALPHA-FETOPROTEIN SERUM: CPT

## 2017-09-11 PROCEDURE — 36415 COLL VENOUS BLD VENIPUNCTURE: CPT | Mod: PO

## 2017-09-11 PROCEDURE — 87517 HEPATITIS B DNA QUANT: CPT

## 2017-09-12 LAB — AFP SERPL-MCNC: 3.2 NG/ML

## 2017-09-14 ENCOUNTER — TELEPHONE (OUTPATIENT)
Dept: HEPATOLOGY | Facility: CLINIC | Age: 42
End: 2017-09-14

## 2017-09-14 NOTE — TELEPHONE ENCOUNTER
----- Message from Griselda Alvarado MD sent at 9/13/2017 11:52 PM CDT -----  Please inform patient results are OK.

## 2017-09-15 LAB
HBV DNA SERPL NAA+PROBE-ACNC: 3.58 LOGIU/ML
HBV DNA SERPL NAA+PROBE-LOG IU: 3882 IU/ML

## 2017-09-16 ENCOUNTER — HOSPITAL ENCOUNTER (OUTPATIENT)
Dept: RADIOLOGY | Facility: HOSPITAL | Age: 42
Discharge: HOME | End: 2017-09-16
Attending: INTERNAL MEDICINE
Payer: COMMERCIAL

## 2017-09-16 DIAGNOSIS — D18.03 LIVER HEMANGIOMA: ICD-10-CM

## 2017-09-16 DIAGNOSIS — R16.0 LIVER MASS: ICD-10-CM

## 2017-09-16 DIAGNOSIS — B18.1 CHRONIC HEPATITIS B: ICD-10-CM

## 2017-09-16 PROCEDURE — A9585 GADOBUTROL INJECTION: HCPCS | Performed by: INTERNAL MEDICINE

## 2017-09-16 PROCEDURE — 74183 MRI ABD W/O CNTR FLWD CNTR: CPT | Mod: TC

## 2017-09-16 PROCEDURE — 74183 MRI ABD W/O CNTR FLWD CNTR: CPT | Mod: 26,,, | Performed by: RADIOLOGY

## 2017-09-16 PROCEDURE — 76700 US EXAM ABDOM COMPLETE: CPT | Mod: TC

## 2017-09-16 PROCEDURE — 25500020 PHARM REV CODE 255: Performed by: INTERNAL MEDICINE

## 2017-09-16 PROCEDURE — 76700 US EXAM ABDOM COMPLETE: CPT | Mod: 26,,, | Performed by: RADIOLOGY

## 2017-09-16 RX ORDER — GADOBUTROL 604.72 MG/ML
7 INJECTION INTRAVENOUS
Status: COMPLETED | OUTPATIENT
Start: 2017-09-16 | End: 2017-09-16

## 2017-09-16 RX ORDER — GADOBUTROL 604.72 MG/ML
INJECTION INTRAVENOUS
Status: DISPENSED
Start: 2017-09-16 | End: 2017-09-16

## 2017-09-16 RX ADMIN — GADOBUTROL 7 ML: 604.72 INJECTION INTRAVENOUS at 09:09

## 2017-09-18 ENCOUNTER — TELEPHONE (OUTPATIENT)
Dept: HEPATOLOGY | Facility: CLINIC | Age: 42
End: 2017-09-18

## 2017-09-18 NOTE — TELEPHONE ENCOUNTER
----- Message from Griselda Alvarado MD sent at 9/16/2017  5:55 AM CDT -----  HBV DNA is a little lower than previous. Liver enzymes were normal. Therefore, will not treat hep B at this time. Continue to monitor CMP, HBV DNA quant and AFP every 6 months. Next due in March 2018.

## 2017-09-20 ENCOUNTER — TELEPHONE (OUTPATIENT)
Dept: HEPATOLOGY | Facility: CLINIC | Age: 42
End: 2017-09-20

## 2017-09-20 NOTE — TELEPHONE ENCOUNTER
----- Message from Fanny Cotter RN sent at 9/20/2017  9:08 AM CDT -----      ----- Message -----  From: Griselda Alvarado MD  Sent: 9/19/2017   9:40 PM  To: [facility]    Pl inform patient:  Ultrasound abd showed:  Gall bladder polyps unchanged. Liver lesion stable.

## 2017-09-20 NOTE — TELEPHONE ENCOUNTER
----- Message from Griselda Alvarado MD sent at 9/19/2017 10:46 PM CDT -----  Pl inform patient:  MRI showed hemangioma same size as before.

## 2017-12-29 ENCOUNTER — DOCUMENTATION ONLY (OUTPATIENT)
Dept: FAMILY MEDICINE | Facility: CLINIC | Age: 42
End: 2017-12-29

## 2018-01-02 ENCOUNTER — TELEPHONE (OUTPATIENT)
Dept: HEPATOLOGY | Facility: CLINIC | Age: 43
End: 2018-01-02

## 2018-01-02 NOTE — TELEPHONE ENCOUNTER
MA called patient to schedule his appt he is requesting Video visit on 2/19 at 10:00 am. Inform patient that I will ask Dr. Alvarado and I will let him know.

## 2018-01-02 NOTE — TELEPHONE ENCOUNTER
----- Message from Brynn Capps sent at 12/28/2017  2:09 PM CST -----  Contact: patient   Caity    Jane would like schedule a appt with Dr. Alvarado in [place].    Thanks,  Brynn  ----- Message -----  From: Sung Melgar  Sent: 12/26/2017   1:25 PM  To: Hepatology Scheduling    Patient called to schedule an appointment.         Please call [phone]          Thanks!

## 2018-01-03 ENCOUNTER — PATIENT MESSAGE (OUTPATIENT)
Dept: HEPATOLOGY | Facility: CLINIC | Age: 43
End: 2018-01-03

## 2018-01-03 ENCOUNTER — OFFICE VISIT (OUTPATIENT)
Dept: FAMILY MEDICINE | Facility: CLINIC | Age: 43
End: 2018-01-03
Payer: COMMERCIAL

## 2018-01-03 VITALS
WEIGHT: 169.31 LBS | HEIGHT: 71 IN | TEMPERATURE: 98 F | BODY MASS INDEX: 23.7 KG/M2 | DIASTOLIC BLOOD PRESSURE: 73 MMHG | HEART RATE: 84 BPM | SYSTOLIC BLOOD PRESSURE: 125 MMHG

## 2018-01-03 DIAGNOSIS — Z00.00 WELLNESS EXAMINATION: Primary | ICD-10-CM

## 2018-01-03 DIAGNOSIS — B18.1 CHRONIC HEPATITIS B: ICD-10-CM

## 2018-01-03 DIAGNOSIS — D18.03 LIVER HEMANGIOMA: ICD-10-CM

## 2018-01-03 PROCEDURE — 99999 PR PBB SHADOW E&M-EST. PATIENT-LVL III: CPT | Mod: PBBFAC,,, | Performed by: FAMILY MEDICINE

## 2018-01-03 PROCEDURE — 99396 PREV VISIT EST AGE 40-64: CPT | Mod: S$GLB,,, | Performed by: FAMILY MEDICINE

## 2018-01-16 PROBLEM — D18.03 LIVER HEMANGIOMA: Status: ACTIVE | Noted: 2017-05-26

## 2018-01-16 NOTE — PROGRESS NOTES
"[facility]  Progress Note    Subjective:       Patient ID: Mouna Garcia is a 42 y.o. male.    Chief Complaint: Follow-up    HPI42 y.o.male is here today for hepatitis B follow-up visit.  Patient has been evaluated by hepatology.  They are monitoring his levels.  Patient does not have any other significant medical history.  No acute complaints at today's visit.  Review of Systems   Constitutional: Negative for chills and fever.   HENT: Negative for congestion.    Eyes: Negative for pain.   Respiratory: Negative for shortness of breath and wheezing.    Cardiovascular: Negative for chest pain, palpitations and leg swelling.   Gastrointestinal: Negative for abdominal pain, nausea and vomiting.   Genitourinary: Negative for difficulty urinating.   Musculoskeletal: Negative for arthralgias, gait problem and myalgias.   Skin: Negative for rash.   Neurological: Negative for seizures.       Objective:      Vitals:    01/03/18 1354   BP: 125/73   BP Location: Right arm   Patient Position: Sitting   BP Method: Large (Automatic)   Pulse: 84   Temp: 98.2 °F (36.8 °C)   TempSrc: Oral   Weight: 76.8 kg (169 lb 5 oz)   Height: 5' 11" (1.803 m)     Body mass index is 23.61 kg/m².  Physical Exam   Constitutional: He is oriented to person, place, and time. He appears well-developed and well-nourished.   HENT:   Head: Normocephalic and atraumatic.   Eyes: Conjunctivae and EOM are normal. Pupils are equal, round, and reactive to light.   Neck: Normal range of motion. Neck supple. No JVD present.   Cardiovascular: Normal rate, regular rhythm, normal heart sounds and intact distal pulses.  Exam reveals no gallop and no friction rub.    No murmur heard.  Pulmonary/Chest: Effort normal and breath sounds normal. No respiratory distress. He has no wheezes.   Abdominal: Soft. Bowel sounds are normal. There is no tenderness.   Musculoskeletal: Normal range of motion.   Neurological: He is alert and oriented to person, place, and " time. No cranial nerve deficit.   Skin: Skin is warm and dry.   Psychiatric: He has a normal mood and affect. His behavior is normal. Judgment and thought content normal.   Nursing note and vitals reviewed.      Assessment:       1. Wellness examination    2. Chronic hepatitis B    3. Liver hemangioma        Plan:       Wellness examination  -     CBC auto differential; Future; Expected date: 01/03/2018  -     Lipid panel; Future; Expected date: 01/03/2018  -     TSH; Future; Expected date: 01/03/2018  -     Vitamin D; Future; Expected date: 01/03/2018  -     Hemoglobin A1c; Future; Expected date: 01/03/2018    Chronic hepatitis B  -     Comprehensive metabolic panel; Future; Expected date: 01/03/2018        - Stable follow up hepatology     Liver hemangioma        - Stable follow up hepatology     Follow-up in about 6 months (around 7/3/2018).  Florentin Harman MD  [facility]  1/16/2018 9:52 AM

## 2018-02-19 ENCOUNTER — OFFICE VISIT (OUTPATIENT)
Dept: HEPATOLOGY | Facility: CLINIC | Age: 43
End: 2018-02-19
Payer: COMMERCIAL

## 2018-02-19 DIAGNOSIS — D18.03 LIVER HEMANGIOMA: ICD-10-CM

## 2018-02-19 DIAGNOSIS — B18.1 CHRONIC HEPATITIS B: Primary | ICD-10-CM

## 2018-02-19 PROCEDURE — 99999 PR PBB SHADOW E&M-EST. PATIENT-LVL II: CPT | Mod: PBBFAC,,, | Performed by: INTERNAL MEDICINE

## 2018-02-19 PROCEDURE — 99444 PR PHYSICIAN ONLINE EVALUATION & MANAGEMENT SERVICE: CPT | Mod: CSM,,, | Performed by: INTERNAL MEDICINE

## 2018-02-19 NOTE — PROGRESS NOTES
[facility] Consultation Note    Reason for Consult:  The primary encounter diagnosis was Chronic hepatitis B. A diagnosis of Liver hemangioma was also pertinent to this visit.    PCP: Florentin Harman       Interval history:  Liver mass found on u/s abd and follow-up MRI confirmed mass to be hemangioma, as below, during first-time evaluation of chronic hep B. Follow-up mRI in 9/16/17 showed no change in size, 2.5 cm, in left lobe of the liver.      Labs have also shown no change, low level hep B viremia, 3000 -4000 IU/mL.  LFTs normal.  AFP remains normal.     Patient feels fine.  Has no swelling or pain.     Us 5/23/17:  The liver is normal in size and contour.  Within the left hepatic lobe, likely segment 4B, there is a 3.3 cm mixed echogenicity mass.  No additional hepatic lesions are identified.  The main portal vein demonstrates normal directional flow.    MRI 5/29/17:  Posteriorly in the medial segment of the left lobe of the liver corresponding to the ultrasound findings there is 2.7 x 2.7 x 1.9 cm mass typical of a hemangioma.  It's signal is that of blood pool, and there is a bright peripheral nodular enhancement on the early arterial phase.  Liver is otherwise unremarkable in appearance and spleen, pancreas, adrenal glands, kidneys show no abnormality.      MRI 9/16/17:   The liver is normal in size and contour.  Within segment 4A, a lobulated 2.5 cm moderately T2 hyperintense mass is again identified.  The mass demonstrates peripheral nodular enhancement, with progressive centripetal filling on later phase imaging which persists into the delayed phase.  Findings are compatible with a hemangioma.    AFP 3.2, HBV DNA 3882 IU/mL. AST and ALT normal 21, 30.     Chronic hep B is HBeAg negative, HBeAb positive, with HBV DNA of 4,340 IU/mL on 5/23/17.  Thus, this is pre-core mutant/core-promoter mutation hep B.  Will need life-long monitoring for flares and HCC.     Patient remains  asymptomatic.      HPI:  This is a 42 y.o. male here for evaluation of:  HBsAg positive, HBcore Ab negative. No HBV DNA available. Transaminases normal 23 each, all LFTs normal. Creatinine normal.  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      Just found out of HBsAg positive when donated blood in approx. 2017.  Had HBcAb IgM negative, HAVAb IgM neg, HCV Ab negative.      Patient does not know if mom has hep B, dad is  from brain aneurysm.  Has 3 sisters and 2 brothers from the same mother, not known if any of them have hep B positive result.      Completely asymptomatic.     Elevated liver enzymes: No  Abnormal imaging: liver mass, consistent with hemangioma.  Cirrhosis: No  Hepatitis C: No  Hepatitis B: Yes  Fatty liver: No  Encephalopathy: No  Post-hospital discharge: No  Symptoms: none    Primary hepatic manifestations:  Fatigue:No  Edema:No  Ascites:No  Encephalopathy:No  Abdominal pain:No  GI bleeds: No  Pruritus:No  Weight Changes:No  Changes in Bowel habits: No  Muscle cramps:No    Risk factors for liver disease:  No jaundice  No transfusions  No IVDU  Did not snort cocaine or similar agents  Did not live with anyone with hepatitis B or C  Sexual partner not tested  No hepatotoxic medications  No exposure to industrial toxins  Alcohol: None      ROS:  Constitutional: No fevers, chills, weight changes, fatigue  ENT: No allergies, nosebleeds,   CV: No chest pain  Pulm: No cough, shortness of breath  Ophtho: No vision changes  GI/Liver: see HPI  Derm: No rash, itching  Heme: No swollen glands, bruising  MSK: No joint pains, joint swelling  : No dysuria, hematuria, decrease in urine output  Endo: No hot or cold intolerance  Neuro: No confusion, disorientation, difficulty with sleep, memory, concentration, syncope, seizure  Psych: No anxiety, depression    Medical History:  has a past medical history of H pylori ulcer.    Surgical History:  has a past surgical history that  includes Lymph node dissection (1997).    Family History: family history is not on file..     Social History:  reports that he has never smoked. He has never used smokeless tobacco. He reports that he drinks alcohol. He reports that he does not use drugs.    Review of patient's allergies indicates:   Allergen Reactions    No known drug allergies        Current Outpatient Prescriptions   Medication Sig    MULTIVITAMIN ORAL Take by mouth Daily.    acetaminophen (TYLENOL EXTRA STRENGTH) 500 MG tablet PRN     No current facility-administered medications for this visit.        Objective Findings:    Vital Signs: due to Video Visit, no vitals can be taken for this visit.   There were no vitals taken for this visit.  There is no height or weight on file to calculate BMI.    Physical Exam:  General Appearance: Well appearing in no acute distress  Head:   Normocephalic, without obvious abnormality  Eyes:    No scleral icterus, EOMI  ENT: not done due to video visit.   Lungs: not done due to video visit.  Heart:  not done due to video visit.  Abdomen: Based on patient's self exam, abd is Soft, non tender, non distended.   Extremities: no cyanosis, and (based on patient's self-exam) no edema  Skin: No rash  Neurologic: alert, oriented x 3. No asterixis      Labs:  Lab Results   Component Value Date    WBC 4.93 03/21/2017    HGB 13.9 (L) 03/21/2017    HCT 41.5 03/21/2017     03/21/2017    CHOL 210 (H) 03/21/2017    TRIG 99 03/21/2017    HDL 46 03/21/2017    CREATININE 0.9 07/03/2017    BUN 15 07/03/2017    BILITOT 0.5 07/03/2017    ALT 30 07/03/2017    AST 21 07/03/2017    ALKPHOS 74 07/03/2017     07/03/2017    K 3.8 07/03/2017     07/03/2017    CO2 29 07/03/2017    TSH 1.060 07/03/2017    HGBA1C 5.6 07/03/2017    AFP 3.2 09/11/2017       Imaging:   Us 5/23/17:  The liver is normal in size and contour.  Within the left hepatic lobe, likely segment 4B, there is a 3.3 cm mixed echogenicity mass.  No  additional hepatic lesions are identified.  The main portal vein demonstrates normal directional flow.    MRI 5/29/17:  Posteriorly in the medial segment of the left lobe of the liver corresponding to the ultrasound findings there is 2.7 x 2.7 x 1.9 cm mass typical of a hemangioma.  It's signal is that of blood pool, and there is a bright peripheral nodular enhancement on the early arterial phase.  Liver is otherwise unremarkable in appearance and spleen, pancreas, adrenal glands, kidneys show no abnormality.      Endoscopy:    None    Assessment:  1. Chronic hepatitis B    2. Liver hemangioma       -  Liver lesion left lobe, 2.5 cm mass typical of a hemangioma.  Because this mass remains nchange, will monitor with u/s abd every 6 months.  Due to Chr hep B in a  male, we would do u/s monitoring every 6 months when age is >40, hence the next u/s will be in March 2018.      -  Chronic hep B is HBeAg negative, HBeAb positive, with HBV DNA of 4,340 IU/mL on 5/23/17.  Thus, this is pre-core mutant/core-promoter mutation hep B.  Currently, although HBV DNA >2000 IU/mL, transaminases completely normal, therefore, will not start anti-hep B antiviral therapy.  But will do life-long monitoring for flares of hepatitis with CMP and HBV DNA q 6 months.     -  HCC Surveillance:  Due to chronic hep B and being male of  origin, will need life-long monitoring for HCC, starting age 40, currently 42.  Will get AFP every 6 months and U/S every 6 months, and mRI if change oted in the liver mass or new lesions appear.           Recommendations:  -  AFP, HBV DNA quant and U/S abd in March 2018.   -  AFP, HBV DNA quant, CMP, U/S abd limited q 6 months starting Sept 2018.   -  Return in 6 months - VidyoVisit    Follow-up in about 6 months (around 8/19/2018).      Order summary:  No orders of the defined types were placed in this encounter.      Thank you so much for allowing me to participate in the care of Mouna Encarnacion  Samira Alvarado MD

## 2018-02-19 NOTE — PATIENT INSTRUCTIONS
Recommendations:  -  AFP, HBV DNA quant and U/S abd in March 2018.   -  AFP, HBV DNA quant, CMP, U/S abd limited q 6 months starting Sept 2018.   -  Return in 6 months - VidyoVisit

## 2018-04-02 ENCOUNTER — LAB VISIT (OUTPATIENT)
Dept: LAB | Facility: HOSPITAL | Age: 43
End: 2018-04-02
Attending: FAMILY MEDICINE
Payer: COMMERCIAL

## 2018-04-02 DIAGNOSIS — B18.1 CHRONIC HEPATITIS B: ICD-10-CM

## 2018-04-02 DIAGNOSIS — Z00.00 WELLNESS EXAMINATION: ICD-10-CM

## 2018-04-02 LAB
25(OH)D3+25(OH)D2 SERPL-MCNC: 38 NG/ML
ALBUMIN SERPL BCP-MCNC: 4 G/DL
ALP SERPL-CCNC: 87 U/L
ALT SERPL W/O P-5'-P-CCNC: 30 U/L
ANION GAP SERPL CALC-SCNC: 6 MMOL/L
AST SERPL-CCNC: 28 U/L
BASOPHILS # BLD AUTO: 0.03 K/UL
BASOPHILS NFR BLD: 0.6 %
BILIRUB SERPL-MCNC: 0.5 MG/DL
BUN SERPL-MCNC: 16 MG/DL
CALCIUM SERPL-MCNC: 9.4 MG/DL
CHLORIDE SERPL-SCNC: 106 MMOL/L
CHOLEST SERPL-MCNC: 228 MG/DL
CHOLEST/HDLC SERPL: 3.8 {RATIO}
CO2 SERPL-SCNC: 27 MMOL/L
CREAT SERPL-MCNC: 0.8 MG/DL
DIFFERENTIAL METHOD: NORMAL
EOSINOPHIL # BLD AUTO: 0.1 K/UL
EOSINOPHIL NFR BLD: 1.5 %
ERYTHROCYTE [DISTWIDTH] IN BLOOD BY AUTOMATED COUNT: 13 %
EST. GFR  (AFRICAN AMERICAN): >60 ML/MIN/1.73 M^2
EST. GFR  (NON AFRICAN AMERICAN): >60 ML/MIN/1.73 M^2
ESTIMATED AVG GLUCOSE: 108 MG/DL
GLUCOSE SERPL-MCNC: 107 MG/DL
HBA1C MFR BLD HPLC: 5.4 %
HCT VFR BLD AUTO: 43.8 %
HDLC SERPL-MCNC: 60 MG/DL
HDLC SERPL: 26.3 %
HGB BLD-MCNC: 14.2 G/DL
IMM GRANULOCYTES # BLD AUTO: 0.01 K/UL
IMM GRANULOCYTES NFR BLD AUTO: 0.2 %
LDLC SERPL CALC-MCNC: 142.4 MG/DL
LYMPHOCYTES # BLD AUTO: 2 K/UL
LYMPHOCYTES NFR BLD: 36.9 %
MCH RBC QN AUTO: 29.2 PG
MCHC RBC AUTO-ENTMCNC: 32.4 G/DL
MCV RBC AUTO: 90 FL
MONOCYTES # BLD AUTO: 0.3 K/UL
MONOCYTES NFR BLD: 5.9 %
NEUTROPHILS # BLD AUTO: 3 K/UL
NEUTROPHILS NFR BLD: 54.9 %
NONHDLC SERPL-MCNC: 168 MG/DL
NRBC BLD-RTO: 0 /100 WBC
PLATELET # BLD AUTO: 202 K/UL
PMV BLD AUTO: 9.8 FL
POTASSIUM SERPL-SCNC: 4.3 MMOL/L
PROT SERPL-MCNC: 7.6 G/DL
RBC # BLD AUTO: 4.86 M/UL
SODIUM SERPL-SCNC: 139 MMOL/L
TRIGL SERPL-MCNC: 128 MG/DL
TSH SERPL DL<=0.005 MIU/L-ACNC: 1.42 UIU/ML
WBC # BLD AUTO: 5.42 K/UL

## 2018-04-02 PROCEDURE — 36415 COLL VENOUS BLD VENIPUNCTURE: CPT | Mod: PO

## 2018-04-02 PROCEDURE — 82306 VITAMIN D 25 HYDROXY: CPT

## 2018-04-02 PROCEDURE — 80053 COMPREHEN METABOLIC PANEL: CPT

## 2018-04-02 PROCEDURE — 85025 COMPLETE CBC W/AUTO DIFF WBC: CPT

## 2018-04-02 PROCEDURE — 84443 ASSAY THYROID STIM HORMONE: CPT

## 2018-04-02 PROCEDURE — 80061 LIPID PANEL: CPT

## 2018-04-02 PROCEDURE — 83036 HEMOGLOBIN GLYCOSYLATED A1C: CPT

## 2018-04-06 ENCOUNTER — OFFICE VISIT (OUTPATIENT)
Dept: FAMILY MEDICINE | Facility: CLINIC | Age: 43
End: 2018-04-06
Payer: COMMERCIAL

## 2018-04-06 VITALS
BODY MASS INDEX: 22.75 KG/M2 | HEART RATE: 60 BPM | WEIGHT: 162.5 LBS | DIASTOLIC BLOOD PRESSURE: 73 MMHG | SYSTOLIC BLOOD PRESSURE: 127 MMHG | HEIGHT: 71 IN | TEMPERATURE: 98 F

## 2018-04-06 DIAGNOSIS — B18.1 CHRONIC HEPATITIS B: Primary | ICD-10-CM

## 2018-04-06 DIAGNOSIS — D18.03 LIVER HEMANGIOMA: ICD-10-CM

## 2018-04-06 DIAGNOSIS — E78.00 PURE HYPERCHOLESTEROLEMIA: ICD-10-CM

## 2018-04-06 PROCEDURE — 99999 PR PBB SHADOW E&M-EST. PATIENT-LVL III: CPT | Mod: PBBFAC,,, | Performed by: FAMILY MEDICINE

## 2018-04-06 PROCEDURE — 99214 OFFICE O/P EST MOD 30 MIN: CPT | Mod: S$GLB,,, | Performed by: FAMILY MEDICINE

## 2018-04-06 NOTE — PROGRESS NOTES
"[facility]  Progress Note    Subjective:       Patient ID: Mouna Garcia is a 42 y.o. male.    Chief Complaint: lab follow up / HLD    HPI42 y.o.male is here today to follow-up abnormal labs.  Patient had annual labs completed.  Recent labs indicated hyperlipidemia.  Patient does not meet current criteria to start statin therapy.  All other medical conditions are currently stable.  No acute complaints at today's visit.  Review of Systems   Constitutional: Negative for chills, fatigue and fever.   HENT: Negative for congestion, ear pain, postnasal drip, sinus pressure, sneezing and sore throat.    Eyes: Negative for pain.   Respiratory: Negative for cough, shortness of breath and wheezing.    Cardiovascular: Negative for chest pain, palpitations and leg swelling.   Gastrointestinal: Negative for abdominal distention, abdominal pain, constipation, diarrhea, nausea and vomiting.   Genitourinary: Negative for difficulty urinating.   Musculoskeletal: Negative for back pain and myalgias.   Skin: Negative for rash.   Neurological: Negative for dizziness, seizures, syncope, weakness and numbness.   Psychiatric/Behavioral: Negative for sleep disturbance. The patient is not nervous/anxious.        Objective:      Vitals:    04/06/18 1405   BP: 127/73   BP Location: Right arm   Patient Position: Sitting   BP Method: Medium (Automatic)   Pulse: 60   Temp: 98.1 °F (36.7 °C)   TempSrc: Oral   Weight: 73.7 kg (162 lb 7.7 oz)   Height: 5' 11" (1.803 m)     Body mass index is 22.66 kg/m².  Physical Exam   Constitutional: He is oriented to person, place, and time. He appears well-developed and well-nourished. No distress.   HENT:   Head: Normocephalic and atraumatic.   Cardiovascular: Normal rate, regular rhythm, normal heart sounds and intact distal pulses.  Exam reveals no gallop and no friction rub.    No murmur heard.  Pulmonary/Chest: Effort normal and breath sounds normal. No respiratory distress. He has no rales. "   Abdominal: Soft. He exhibits no distension and no mass. There is no rebound and no guarding. No hernia.   Musculoskeletal: Normal range of motion.   Neurological: He is alert and oriented to person, place, and time.   Skin: Skin is warm.   Psychiatric: He has a normal mood and affect. His behavior is normal. Judgment and thought content normal.   Vitals reviewed.      Assessment:       1. Chronic hepatitis B    2. Liver hemangioma    3. Pure hypercholesterolemia        Plan:       Chronic hepatitis B        -  Stable continue to monitor         - Follow up with hepatology   Liver hemangioma        -  Stable continue to monitor         - Follow up with hepatology  Pure hypercholesterolemia        - Patient educated on diet and exercise       Follow-up in about 6 months (around 10/6/2018).  Florentin Harman MD  [facility]  4/6/2018 2:43 PM

## 2018-07-24 ENCOUNTER — TELEPHONE (OUTPATIENT)
Dept: HEPATOLOGY | Facility: CLINIC | Age: 43
End: 2018-07-24

## 2018-07-24 DIAGNOSIS — B18.1 CHRONIC HEPATITIS B WITHOUT DELTA AGENT WITHOUT HEPATIC COMA: Primary | ICD-10-CM

## 2018-07-24 DIAGNOSIS — D18.03 LIVER HEMANGIOMA: ICD-10-CM

## 2018-07-24 NOTE — TELEPHONE ENCOUNTER
----- Message from Devon Swift sent at 7/24/2018 10:16 AM CDT -----  Type: Needs Medical Advice    Who Called:  Patient  Best Call Back Number: [phone]  Additional Information: Patient stated he should have orders for an ultrasound. Please call to advise.

## 2018-07-24 NOTE — TELEPHONE ENCOUNTER
MA called patient back, he is due for labs, US and video visit. Inform patient that we are going to ask MD for US order so we can schedule his test. We are going to call him back once order is ready.

## 2018-07-27 ENCOUNTER — HOSPITAL ENCOUNTER (OUTPATIENT)
Dept: RADIOLOGY | Facility: CLINIC | Age: 43
Discharge: HOME | End: 2018-07-27
Attending: INTERNAL MEDICINE
Payer: COMMERCIAL

## 2018-07-27 DIAGNOSIS — B18.1 CHRONIC HEPATITIS B WITHOUT DELTA AGENT WITHOUT HEPATIC COMA: ICD-10-CM

## 2018-07-27 DIAGNOSIS — D18.03 LIVER HEMANGIOMA: ICD-10-CM

## 2018-07-27 PROCEDURE — 76700 US EXAM ABDOM COMPLETE: CPT | Mod: 26,,, | Performed by: RADIOLOGY

## 2018-07-27 PROCEDURE — 76700 US EXAM ABDOM COMPLETE: CPT | Mod: TC,PO

## 2018-07-30 ENCOUNTER — TELEPHONE (OUTPATIENT)
Dept: HEPATOLOGY | Facility: CLINIC | Age: 43
End: 2018-07-30

## 2018-07-30 NOTE — TELEPHONE ENCOUNTER
----- Message from Griselda Alvarado MD sent at 7/29/2018  8:50 PM CDT -----  Please inform patient results are OK.

## 2018-07-30 NOTE — TELEPHONE ENCOUNTER
----- Message from Griselda Alvarado MD sent at 7/29/2018  8:49 PM CDT -----  Pl inform pt:  U/s showed  Stable previously seen lesion. No new lesion. continue surveillance with afp, u/s every 6 months.

## 2018-08-02 ENCOUNTER — TELEPHONE (OUTPATIENT)
Dept: HEPATOLOGY | Facility: CLINIC | Age: 43
End: 2018-08-02

## 2018-08-02 NOTE — TELEPHONE ENCOUNTER
----- Message from Griselda Alvarado MD sent at 8/1/2018 10:52 PM CDT -----  Pl inform patient:  HBV DNA 2180 IU/mL. No treatment needed.

## 2018-08-03 ENCOUNTER — PATIENT MESSAGE (OUTPATIENT)
Dept: HEPATOLOGY | Facility: CLINIC | Age: 43
End: 2018-08-03

## 2018-08-10 ENCOUNTER — TELEPHONE (OUTPATIENT)
Dept: HEPATOLOGY | Facility: CLINIC | Age: 43
End: 2018-08-10

## 2018-08-31 ENCOUNTER — TELEPHONE (OUTPATIENT)
Dept: HEPATOLOGY | Facility: CLINIC | Age: 43
End: 2018-08-31

## 2018-08-31 ENCOUNTER — OFFICE VISIT (OUTPATIENT)
Dept: HEPATOLOGY | Facility: CLINIC | Age: 43
End: 2018-08-31

## 2018-08-31 DIAGNOSIS — D18.03 LIVER HEMANGIOMA: ICD-10-CM

## 2018-08-31 DIAGNOSIS — B18.1 CHRONIC HEPATITIS B: Primary | ICD-10-CM

## 2018-08-31 PROCEDURE — 99444 PR PHYSICIAN ONLINE EVALUATION & MANAGEMENT SERVICE: CPT | Mod: CSM,,, | Performed by: INTERNAL MEDICINE

## 2018-08-31 PROCEDURE — 99999 PR PBB SHADOW E&M-EST. PATIENT-LVL II: CPT | Mod: PBBFAC,,, | Performed by: INTERNAL MEDICINE

## 2018-08-31 NOTE — PROGRESS NOTES
[facility] Consultation Note    Reason for Consult:  The primary encounter diagnosis was Chronic hepatitis B. A diagnosis of Liver hemangioma was also pertinent to this visit.    PCP: Florentin Harman (Inactive)       Interval history:  Liver mass found on u/s abd and follow-up MRI confirmed mass to be hemangioma, as below, during first-time evaluation of chronic hep B. Follow-up mRI in 9/16/17 showed no change in size, 2.5 cm, in left lobe of the liver.      Labs have also shown no change, low level hep B viremia, 3000 -4000 IU/mL.  LFTs normal.  AFP remains normal.     Patient feels fine.  Has no swelling or pain.     Us 5/23/17:  The liver is normal in size and contour.  Within the left hepatic lobe, likely segment 4B, there is a 3.3 cm mixed echogenicity mass.  No additional hepatic lesions are identified.  The main portal vein demonstrates normal directional flow.    MRI 5/29/17:  Posteriorly in the medial segment of the left lobe of the liver corresponding to the ultrasound findings there is 2.7 x 2.7 x 1.9 cm mass typical of a hemangioma.  It's signal is that of blood pool, and there is a bright peripheral nodular enhancement on the early arterial phase.  Liver is otherwise unremarkable in appearance and spleen, pancreas, adrenal glands, kidneys show no abnormality.      MRI 9/16/17:   The liver is normal in size and contour.  Within segment 4A, a lobulated 2.5 cm moderately T2 hyperintense mass is again identified.  The mass demonstrates peripheral nodular enhancement, with progressive centripetal filling on later phase imaging which persists into the delayed phase.  Findings are compatible with a hemangioma.    AFP 3.2, HBV DNA 3882 IU/mL. AST and ALT normal 21, 30.     Chronic hep B is HBeAg negative, HBeAb positive, with HBV DNA of 4,340 IU/mL on 5/23/17.  Thus, this is pre-core mutant/core-promoter mutation hep B.  Will need life-long monitoring for flares and HCC.     Patient remains  asymptomatic.      HPI:  This is a 43 y.o. male here for evaluation of:  HBsAg positive, HBcore Ab negative. No HBV DNA available. Transaminases normal 23 each, all LFTs normal. Creatinine normal.  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      Just found out of HBsAg positive when donated blood in approx. 2017.  Had HBcAb IgM negative, HAVAb IgM neg, HCV Ab negative.      Patient does not know if mom has hep B, dad is  from brain aneurysm.  Has 3 sisters and 2 brothers from the same mother, not known if any of them have hep B positive result.      Completely asymptomatic.     Elevated liver enzymes: No  Abnormal imaging: liver mass, consistent with hemangioma.  Cirrhosis: No  Hepatitis C: No  Hepatitis B: Yes  Fatty liver: No  Encephalopathy: No  Post-hospital discharge: No  Symptoms: none    Primary hepatic manifestations:  Fatigue:No  Edema:No  Ascites:No  Encephalopathy:No  Abdominal pain:No  GI bleeds: No  Pruritus:No  Weight Changes:No  Changes in Bowel habits: No  Muscle cramps:No    Risk factors for liver disease:  No jaundice  No transfusions  No IVDU  Did not snort cocaine or similar agents  Did not live with anyone with hepatitis B or C  Sexual partner not tested  No hepatotoxic medications  No exposure to industrial toxins  Alcohol: None      ROS:  Constitutional: No fevers, chills, weight changes, fatigue  ENT: No allergies, nosebleeds,   CV: No chest pain  Pulm: No cough, shortness of breath  Ophtho: No vision changes  GI/Liver: see HPI  Derm: No rash, itching  Heme: No swollen glands, bruising  MSK: No joint pains, joint swelling  : No dysuria, hematuria, decrease in urine output  Endo: No hot or cold intolerance  Neuro: No confusion, disorientation, difficulty with sleep, memory, concentration, syncope, seizure  Psych: No anxiety, depression    Medical History:  has a past medical history of H pylori ulcer.    Surgical History:  has a past surgical history that  includes Lymph node dissection (1997).    Family History: family history is not on file..     Social History:  reports that  has never smoked. he has never used smokeless tobacco. He reports that he drinks alcohol. He reports that he does not use drugs.    Review of patient's allergies indicates:   Allergen Reactions    No known drug allergies        Current Outpatient Medications   Medication Sig    acetaminophen (TYLENOL EXTRA STRENGTH) 500 MG tablet PRN    MULTIVITAMIN ORAL Take by mouth Daily.     No current facility-administered medications for this visit.        Objective Findings:    Vital Signs: due to Video Visit, no vitals can be taken for this visit.   There were no vitals taken for this visit.  There is no height or weight on file to calculate BMI.    Physical Exam:  General Appearance: Well appearing in no acute distress  Head:   Normocephalic, without obvious abnormality  Eyes:    No scleral icterus, EOMI  ENT: not done due to video visit.   Lungs: not done due to video visit.  Heart:  not done due to video visit.  Abdomen: Based on patient's self exam, abd is Soft, non tender, non distended.   Extremities: no cyanosis, and (based on patient's self-exam) no edema  Skin: No rash  Neurologic: alert, oriented x 3. No asterixis      Labs:  Lab Results   Component Value Date    WBC 4.62 07/27/2018    HGB 14.2 07/27/2018    HCT 44.4 07/27/2018     07/27/2018    CHOL 228 (H) 04/02/2018    TRIG 128 04/02/2018    HDL 60 04/02/2018    INR 1.0 07/27/2018    CREATININE 0.9 07/27/2018    BUN 14 07/27/2018    BILITOT 1.2 (H) 07/27/2018    ALT 23 07/27/2018    AST 22 07/27/2018    ALKPHOS 65 07/27/2018     07/27/2018    K 4.2 07/27/2018     07/27/2018    CO2 27 07/27/2018    TSH 1.419 04/02/2018    HGBA1C 5.4 04/02/2018    AFP 2.6 07/27/2018       Imaging:   Us 5/23/17:  The liver is normal in size and contour.  Within the left hepatic lobe, likely segment 4B, there is a 3.3 cm mixed  echogenicity mass.  No additional hepatic lesions are identified.  The main portal vein demonstrates normal directional flow.    MRI 5/29/17:  Posteriorly in the medial segment of the left lobe of the liver corresponding to the ultrasound findings there is 2.7 x 2.7 x 1.9 cm mass typical of a hemangioma.  It's signal is that of blood pool, and there is a bright peripheral nodular enhancement on the early arterial phase.  Liver is otherwise unremarkable in appearance and spleen, pancreas, adrenal glands, kidneys show no abnormality.      Endoscopy:    None    Assessment:  1. Chronic hepatitis B    2. Liver hemangioma       -  Hepatic hemangioma:  Liver lesion left lobe, 2.5 cm mass typical of a hemangioma.  Because this mass remains nchange, will monitor with u/s abd every 6 months.  Due to Chr hep B in a  male, we would do u/s monitoring every 6 months when age is >40, hence the next u/s will be in January 2019.      -  Chronic hep B: is HBeAg negative, HBeAb positive, with HBV DNA of 2,100 IU/mL on 7/27/18.  Thus, this is pre-core mutant/core-promoter mutation hep B.  Currently, although HBV DNA >2000 IU/mL, transaminases remain completely normal, therefore, will not start anti-hep B antiviral therapy.  But will do life-long monitoring for flares of hepatitis with CMP and HBV DNA q 6 months.     -  HCC Surveillance:  Due to chronic hep B and being male of  origin, will need life-long monitoring for HCC, starting age 40, currently 43.  Will get AFP every 6 months and U/S every 6 months, and mRI if change oted in the liver mass or new lesions appear.           Recommendations:  -  CMP in one month, Sept end 2018.  -  AFP, HBV DNA quant, CMP, U/S abd limited q 6 months starting January 2019.   -  Return in 6 months - V[facility]yoVisit    Follow-up in about 6 months (around 2/28/2019).      Order summary:  No orders of the defined types were placed in this encounter.      Thank you so much for allowing me to  participate in the care of Mounasusan Mccormick MD

## 2018-08-31 NOTE — Clinical Note
Recommendations:-  CMP in one month, Sept end 2018.-  AFP, HBV DNA quant, CMP, U/S abd limited q 6 months starting January 2019. -  Return in 6 months - VidyoVisit

## 2018-08-31 NOTE — TELEPHONE ENCOUNTER
----- Message from Griselda Alvarado MD sent at 8/31/2018  1:22 PM CDT -----  Recommendations:  -  CMP in one month, Sept end 2018.  -  AFP, HBV DNA quant, CMP, U/S abd limited q 6 months starting January 2019.   -  Return in 6 months - [facility]Visit

## 2018-08-31 NOTE — PATIENT INSTRUCTIONS
Recommendations:  -  CMP in one month, Sept end 2018.  -  AFP, HBV DNA quant, CMP, U/S abd limited q 6 months starting January 2019.   -  Return in 6 months - VidyoVisit

## 2018-09-11 ENCOUNTER — PATIENT MESSAGE (OUTPATIENT)
Dept: HEPATOLOGY | Facility: CLINIC | Age: 43
End: 2018-09-11

## 2018-09-12 ENCOUNTER — PATIENT MESSAGE (OUTPATIENT)
Dept: HEPATOLOGY | Facility: CLINIC | Age: 43
End: 2018-09-12

## 2018-10-22 ENCOUNTER — LAB VISIT (OUTPATIENT)
Dept: LAB | Facility: HOSPITAL | Age: 43
End: 2018-10-22
Attending: INTERNAL MEDICINE
Payer: COMMERCIAL

## 2018-10-22 ENCOUNTER — OFFICE VISIT (OUTPATIENT)
Dept: FAMILY MEDICINE | Facility: CLINIC | Age: 43
End: 2018-10-22
Payer: COMMERCIAL

## 2018-10-22 ENCOUNTER — DOCUMENTATION ONLY (OUTPATIENT)
Dept: FAMILY MEDICINE | Facility: CLINIC | Age: 43
End: 2018-10-22

## 2018-10-22 VITALS
HEIGHT: 71 IN | BODY MASS INDEX: 22.99 KG/M2 | SYSTOLIC BLOOD PRESSURE: 132 MMHG | WEIGHT: 164.25 LBS | DIASTOLIC BLOOD PRESSURE: 72 MMHG | TEMPERATURE: 98 F | HEART RATE: 92 BPM

## 2018-10-22 DIAGNOSIS — B18.1 CHRONIC HEPATITIS B: Primary | ICD-10-CM

## 2018-10-22 DIAGNOSIS — D18.03 LIVER HEMANGIOMA: ICD-10-CM

## 2018-10-22 DIAGNOSIS — R16.0 LIVER MASS: ICD-10-CM

## 2018-10-22 DIAGNOSIS — E78.00 PURE HYPERCHOLESTEROLEMIA: ICD-10-CM

## 2018-10-22 DIAGNOSIS — B18.1 CHRONIC HEPATITIS B WITHOUT DELTA AGENT WITHOUT HEPATIC COMA: ICD-10-CM

## 2018-10-22 DIAGNOSIS — B18.1 CHRONIC HEPATITIS B: ICD-10-CM

## 2018-10-22 LAB
AFP SERPL-MCNC: 2.7 NG/ML
ALBUMIN SERPL BCP-MCNC: 4.1 G/DL
ALP SERPL-CCNC: 83 U/L
ALT SERPL W/O P-5'-P-CCNC: 36 U/L
ANION GAP SERPL CALC-SCNC: 7 MMOL/L
AST SERPL-CCNC: 29 U/L
BASOPHILS # BLD AUTO: 0.03 K/UL
BASOPHILS NFR BLD: 0.4 %
BILIRUB SERPL-MCNC: 0.5 MG/DL
BUN SERPL-MCNC: 10 MG/DL
CALCIUM SERPL-MCNC: 9.8 MG/DL
CHLORIDE SERPL-SCNC: 101 MMOL/L
CO2 SERPL-SCNC: 27 MMOL/L
CREAT SERPL-MCNC: 0.9 MG/DL
DIFFERENTIAL METHOD: ABNORMAL
EOSINOPHIL # BLD AUTO: 0 K/UL
EOSINOPHIL NFR BLD: 0.4 %
ERYTHROCYTE [DISTWIDTH] IN BLOOD BY AUTOMATED COUNT: 12.7 %
EST. GFR  (AFRICAN AMERICAN): >60 ML/MIN/1.73 M^2
EST. GFR  (NON AFRICAN AMERICAN): >60 ML/MIN/1.73 M^2
GLUCOSE SERPL-MCNC: 100 MG/DL
HCT VFR BLD AUTO: 42.7 %
HGB BLD-MCNC: 13.5 G/DL
IMM GRANULOCYTES # BLD AUTO: 0.06 K/UL
IMM GRANULOCYTES NFR BLD AUTO: 0.8 %
INR PPP: 0.9
LYMPHOCYTES # BLD AUTO: 2.4 K/UL
LYMPHOCYTES NFR BLD: 32 %
MCH RBC QN AUTO: 28.5 PG
MCHC RBC AUTO-ENTMCNC: 31.6 G/DL
MCV RBC AUTO: 90 FL
MONOCYTES # BLD AUTO: 0.5 K/UL
MONOCYTES NFR BLD: 7.3 %
NEUTROPHILS # BLD AUTO: 4.4 K/UL
NEUTROPHILS NFR BLD: 59.1 %
NRBC BLD-RTO: 0 /100 WBC
PLATELET # BLD AUTO: 267 K/UL
PMV BLD AUTO: 9.2 FL
POTASSIUM SERPL-SCNC: 4.2 MMOL/L
PROT SERPL-MCNC: 8 G/DL
PROTHROMBIN TIME: 9.8 SEC
RBC # BLD AUTO: 4.74 M/UL
SODIUM SERPL-SCNC: 135 MMOL/L
WBC # BLD AUTO: 7.43 K/UL

## 2018-10-22 PROCEDURE — 87517 HEPATITIS B DNA QUANT: CPT

## 2018-10-22 PROCEDURE — 85025 COMPLETE CBC W/AUTO DIFF WBC: CPT

## 2018-10-22 PROCEDURE — 99999 PR PBB SHADOW E&M-EST. PATIENT-LVL III: CPT | Mod: PBBFAC,,, | Performed by: PHYSICIAN ASSISTANT

## 2018-10-22 PROCEDURE — 80053 COMPREHEN METABOLIC PANEL: CPT

## 2018-10-22 PROCEDURE — 3008F BODY MASS INDEX DOCD: CPT | Mod: CPTII,S$GLB,, | Performed by: PHYSICIAN ASSISTANT

## 2018-10-22 PROCEDURE — 90686 IIV4 VACC NO PRSV 0.5 ML IM: CPT | Mod: S$GLB,,, | Performed by: PHYSICIAN ASSISTANT

## 2018-10-22 PROCEDURE — 82105 ALPHA-FETOPROTEIN SERUM: CPT

## 2018-10-22 PROCEDURE — 99213 OFFICE O/P EST LOW 20 MIN: CPT | Mod: 25,S$GLB,, | Performed by: PHYSICIAN ASSISTANT

## 2018-10-22 PROCEDURE — 90471 IMMUNIZATION ADMIN: CPT | Mod: S$GLB,,, | Performed by: PHYSICIAN ASSISTANT

## 2018-10-22 PROCEDURE — 85610 PROTHROMBIN TIME: CPT

## 2018-10-22 PROCEDURE — 36415 COLL VENOUS BLD VENIPUNCTURE: CPT | Mod: PO

## 2018-10-22 NOTE — PROGRESS NOTES
Administered flu vaccine, IM. Identified patient's name&. Patient tolerated well, aseptic technique maintained. Pain scale 0/10 with injection. Instructed patient to wait in the clinic for 15 minutes after the shot was given.

## 2018-10-22 NOTE — PROGRESS NOTES
Pre-Visit Chart Review  For Appointment Scheduled on 10/22/2018    Health Maintenance Due   Topic Date Due    Influenza Vaccine  08/01/2018

## 2018-10-22 NOTE — PROGRESS NOTES
Subjective:       Patient ID: Mouna Garcia is a 43 y.o. male.    Chief Complaint: Follow-up    The patient with chronic hepatitis-B, liver hemangioma, and hyperlipidemia presents for routine follow-up.  The patient is currently feeling well.  He has no acute complaints.  All of his chronic medical conditions are currently stable.  He is due for influenza vaccine which she is agreeable to have today.  Patients patient medical/surgical, social and family histories have been reviewed         Review of Systems   Constitutional: Negative for activity change and unexpected weight change.   HENT: Negative for hearing loss, rhinorrhea and trouble swallowing.    Eyes: Negative for discharge and visual disturbance.   Respiratory: Negative for chest tightness and wheezing.    Cardiovascular: Negative for chest pain and palpitations.   Gastrointestinal: Negative for blood in stool, constipation, diarrhea and vomiting.   Endocrine: Negative for polydipsia and polyuria.   Genitourinary: Negative for difficulty urinating, hematuria and urgency.   Musculoskeletal: Negative for arthralgias, joint swelling and neck pain.   Neurological: Negative for weakness and headaches.   Psychiatric/Behavioral: Negative for confusion and dysphoric mood.       Objective:      Physical Exam   Constitutional: He appears well-developed and well-nourished. He is cooperative. No distress.   Eyes: Conjunctivae and EOM are normal. No scleral icterus.   Neck: Carotid bruit is not present.   Cardiovascular: Normal rate, regular rhythm and normal heart sounds.   Pulmonary/Chest: Effort normal and breath sounds normal.   Abdominal: Soft. Bowel sounds are normal. There is no tenderness.   Musculoskeletal:        Right lower leg: He exhibits no edema.        Left lower leg: He exhibits no edema.   Neurological: He is alert.   Vitals reviewed.      Assessment:       1. Chronic hepatitis B    2. Pure hypercholesterolemia    3. Liver hemangioma        Plan:      Mouna was seen today for follow-up.    Diagnoses and all orders for this visit:    Chronic hepatitis B  Continue per hepatology  Pure hypercholesterolemia  Continue low-cholesterol diet and exercise.  Repeat labs April 2019  Liver hemangioma    Continue per hepatology

## 2018-10-23 ENCOUNTER — TELEPHONE (OUTPATIENT)
Dept: HEPATOLOGY | Facility: CLINIC | Age: 43
End: 2018-10-23

## 2018-10-23 NOTE — TELEPHONE ENCOUNTER
----- Message from Griselda Alvarado MD sent at 10/22/2018  7:14 PM CDT -----  Labs results so far look good. Tumor marker is normal. Hep B viral result still pending.

## 2018-11-06 LAB
HBV DNA SERPL NAA+PROBE-ACNC: 2.41 LOGIU/ML
HBV DNA SERPL NAA+PROBE-LOG IU: 260 IU/ML

## 2018-11-07 ENCOUNTER — TELEPHONE (OUTPATIENT)
Dept: HEPATOLOGY | Facility: CLINIC | Age: 43
End: 2018-11-07

## 2018-11-07 NOTE — TELEPHONE ENCOUNTER
----- Message from Griselda Alvarado MD sent at 11/7/2018  4:06 AM CST -----  Please inform patient results are OK.

## 2018-11-07 NOTE — TELEPHONE ENCOUNTER
----- Message from Griselda Alvarado MD sent at 11/7/2018  4:06 AM CST -----  Please inform patient results are OK.     Mailed results..........

## 2019-02-14 ENCOUNTER — TELEPHONE (OUTPATIENT)
Dept: HEPATOLOGY | Facility: CLINIC | Age: 44
End: 2019-02-14

## 2019-02-14 NOTE — TELEPHONE ENCOUNTER
----- Message from Brynn aCpps sent at 2/11/2019  1:46 PM CST -----  Contact: Patient    Vivian Caity    Patient would like to set up a video visit and labs/u/s in [place] a week before video visit.    Thanks,  Brynn  ----- Message -----  From: Jacklyn Villa  Sent: 2/11/2019   1:31 PM  To: Hepatology Scheduling    Needs Advice    Reason for call: Schedule F/U        Communication Preference: [phone]    Additional Information: n/a

## 2019-02-15 ENCOUNTER — PATIENT MESSAGE (OUTPATIENT)
Dept: HEPATOLOGY | Facility: CLINIC | Age: 44
End: 2019-02-15

## 2019-03-25 ENCOUNTER — HOSPITAL ENCOUNTER (OUTPATIENT)
Dept: RADIOLOGY | Facility: CLINIC | Age: 44
Discharge: HOME | End: 2019-03-25
Attending: INTERNAL MEDICINE
Payer: COMMERCIAL

## 2019-03-25 DIAGNOSIS — D18.03 LIVER HEMANGIOMA: ICD-10-CM

## 2019-03-25 DIAGNOSIS — B18.1 CHRONIC HEPATITIS B WITHOUT DELTA AGENT WITHOUT HEPATIC COMA: ICD-10-CM

## 2019-03-25 PROCEDURE — 76700 US EXAM ABDOM COMPLETE: CPT | Mod: TC,PO

## 2019-03-25 PROCEDURE — 76700 US EXAM ABDOM COMPLETE: CPT | Mod: 26,,, | Performed by: RADIOLOGY

## 2019-03-25 PROCEDURE — 76700 US ABDOMEN COMPLETE: ICD-10-PCS | Mod: 26,,, | Performed by: RADIOLOGY

## 2019-03-26 ENCOUNTER — TELEPHONE (OUTPATIENT)
Dept: HEPATOLOGY | Facility: CLINIC | Age: 44
End: 2019-03-26

## 2019-03-26 NOTE — TELEPHONE ENCOUNTER
----- Message from Griselda Alvarado MD sent at 3/25/2019  5:24 PM CDT -----  Pl inform patient:    Ultrasound abd showed:    1. Left hepatic lobe hemangioma which is unchanged in size and appearance.  2. Gallbladder polyps which are unchanged.    Not to worry.

## 2019-03-26 NOTE — TELEPHONE ENCOUNTER
----- Message from Griselda Alvarado MD sent at 3/25/2019  5:23 PM CDT -----  Please inform patient results are OK.

## 2019-04-01 ENCOUNTER — TELEPHONE (OUTPATIENT)
Dept: HEPATOLOGY | Facility: CLINIC | Age: 44
End: 2019-04-01

## 2019-04-01 NOTE — TELEPHONE ENCOUNTER
----- Message from Griselda Alvarado MD sent at 3/30/2019  5:41 AM CDT -----  No treatment indicated. Pl inform pt.

## 2019-04-02 ENCOUNTER — PATIENT MESSAGE (OUTPATIENT)
Dept: HEPATOLOGY | Facility: CLINIC | Age: 44
End: 2019-04-02

## 2019-04-02 ENCOUNTER — OFFICE VISIT (OUTPATIENT)
Dept: HEPATOLOGY | Facility: CLINIC | Age: 44
End: 2019-04-02

## 2019-04-02 DIAGNOSIS — B18.1 CHRONIC HEPATITIS B: Primary | ICD-10-CM

## 2019-04-02 DIAGNOSIS — D18.03 LIVER HEMANGIOMA: ICD-10-CM

## 2019-04-02 PROCEDURE — 99213 PR OFFICE/OUTPT VISIT, EST, LEVL III, 20-29 MIN: ICD-10-PCS | Mod: 95,S$GLB,, | Performed by: INTERNAL MEDICINE

## 2019-04-02 PROCEDURE — 99212 OFFICE O/P EST SF 10 MIN: CPT | Performed by: INTERNAL MEDICINE

## 2019-04-02 PROCEDURE — 99213 OFFICE O/P EST LOW 20 MIN: CPT | Mod: 95,S$GLB,, | Performed by: INTERNAL MEDICINE

## 2019-04-02 NOTE — PATIENT INSTRUCTIONS
Recommendations:  -  CMP, HBV DNA quant, AFP every 6 months, next due in Sept 2019.   -  U/S abd limited every 12 months starting March 2020.   -  Return in 6 months - VidyoVisit

## 2019-04-02 NOTE — PROGRESS NOTES
[facility] VideoVisit Note    Reason for Consult:  The primary encounter diagnosis was Chronic hepatitis B. A diagnosis of Liver hemangioma was also pertinent to this visit.    PCP: Florentin Harman (Inactive)       THIS IS A VIDEOVISIT, THEREFORE, ALL THE COMPONENTS OF PHYSICAL EXAM ARE NOT INCLUDED.      Interval history:  -  Liver mass found on u/s abd and follow-up MRI confirmed mass to be hemangioma, has shown minimal increase in size.    -  Chronic hepatitis B also remains stable with normal transaminases and HBV DNA 1,100 IU/mL.  -  GB polyps - stable.       -  Patient feels fine.  Has no swelling or pain.  Wt stable.    US abd 3/25/19:  - Liver is normal in size and overall echogenicity.    - A hemangioma measuring 3.8 x 3.2 x 2.3 cm is again identified within the lateral segment of the left hepatic lobe which remains unchanged from previous studies.    - Approximately 4 gallbladder polyps are present, the largest measuring 6 mm are unchanged.    - The bile ducts are not dilated.  Common bile duct diameter is 2.7 mm.    - Doppler of the main portal vein confirms a normal waveform and direction of flow.   -  Spleen and pancreas are normal in size and appearance.    -  No ascites is present.      Us 5/23/17:  The liver is normal in size and contour.  Within the left hepatic lobe, likely segment 4B, there is a 3.3 cm mixed echogenicity mass.  No additional hepatic lesions are identified.  The main portal vein demonstrates normal directional flow.    MRI 5/29/17:  Posteriorly in the medial segment of the left lobe of the liver corresponding to the ultrasound findings there is 2.7 x 2.7 x 1.9 cm mass typical of a hemangioma.  It's signal is that of blood pool, and there is a bright peripheral nodular enhancement on the early arterial phase.  Liver is otherwise unremarkable in appearance and spleen, pancreas, adrenal glands, kidneys show no abnormality.      MRI 9/16/17:   The liver is normal in  size and contour.  Within segment 4A, a lobulated 2.5 cm moderately T2 hyperintense mass is again identified.  The mass demonstrates peripheral nodular enhancement, with progressive centripetal filling on later phase imaging which persists into the delayed phase.  Findings are compatible with a hemangioma.    AFP 3.2, HBV DNA 3882 IU/mL. AST and ALT normal 21, 30.     Chronic hep B is HBeAg negative, HBeAb positive, with HBV DNA of 4,340 IU/mL on 17.  Thus, this is pre-core mutant/core-promoter mutation hep B.  Will need life-long monitoring for flares and HCC.     Patient remains asymptomatic.      HPI:  This is a 43 y.o. male here for evaluation of:  HBsAg positive, HBcore Ab negative. No HBV DNA available. Transaminases normal 23 each, all LFTs normal. Creatinine normal.  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      Just found out of HBsAg positive when donated blood in approx. 2017.  Had HBcAb IgM negative, HAVAb IgM neg, HCV Ab negative.      Patient does not know if mom has hep B, dad is  from brain aneurysm.  Has 3 sisters and 2 brothers from the same mother, not known if any of them have hep B positive result.      Completely asymptomatic.     Elevated liver enzymes: No  Abnormal imaging: liver mass, consistent with hemangioma.  Cirrhosis: No  Hepatitis C: No  Hepatitis B: Yes  Fatty liver: No  Encephalopathy: No  Post-hospital discharge: No  Symptoms: none    Primary hepatic manifestations:  Fatigue:No  Edema:No  Ascites:No  Encephalopathy:No  Abdominal pain:No  GI bleeds: No  Pruritus:No  Weight Changes:No  Changes in Bowel habits: No  Muscle cramps:No    Risk factors for liver disease:  No jaundice  No transfusions  No IVDU  Did not snort cocaine or similar agents  Did not live with anyone with hepatitis B or C  Sexual partner not tested  No hepatotoxic medications  No exposure to industrial toxins  Alcohol: None      ROS:  Constitutional: No fevers, chills,  weight changes, fatigue  ENT: No allergies, nosebleeds,   CV: No chest pain  Pulm: No cough, shortness of breath  Ophtho: No vision changes  GI/Liver: see HPI  Derm: No rash, itching  Heme: No swollen glands, bruising  MSK: No joint pains, joint swelling  : No dysuria, hematuria, decrease in urine output  Endo: No hot or cold intolerance  Neuro: No confusion, disorientation, difficulty with sleep, memory, concentration, syncope, seizure  Psych: No anxiety, depression    Medical History:  has a past medical history of H pylori ulcer.    Surgical History:  has a past surgical history that includes Lymph node dissection (1997).    Family History: family history is not on file..     Social History:  reports that he has never smoked. He has never used smokeless tobacco. He reports that he drinks alcohol. He reports that he does not use drugs.    Review of patient's allergies indicates:   Allergen Reactions    No known drug allergies        Current Outpatient Medications   Medication Sig    MULTIVITAMIN ORAL Take by mouth Daily.     No current facility-administered medications for this visit.        Objective Findings:    Vital Signs: due to Video Visit, no vitals can be taken for this visit.   There were no vitals taken for this visit.  There is no height or weight on file to calculate BMI.   Patient weighs himself at home, wt between 155-160 lb.      Physical Exam:  General Appearance: Well appearing in no acute distress  Head:   Normocephalic, without obvious abnormality  Eyes:    No scleral icterus, EOMI  ENT: not done due to video visit.   Lungs: not done due to video visit.  Heart:  not done due to video visit.  Abdomen: Based on patient's self exam, abd is Soft, non tender, non distended.   Extremities: no cyanosis, and (based on patient's self-exam) no edema  Skin: No rash  Neurologic: alert, oriented x 3. No asterixis      Labs:  Lab Results   Component Value Date    WBC 4.77 03/25/2019    HGB 14.2 03/25/2019     HCT 44.0 03/25/2019     03/25/2019    CHOL 228 (H) 04/02/2018    TRIG 128 04/02/2018    HDL 60 04/02/2018    INR 1.0 03/25/2019    CREATININE 0.9 03/25/2019    BUN 15 03/25/2019    BILITOT 0.8 03/25/2019    ALT 21 03/25/2019    AST 22 03/25/2019    ALKPHOS 82 03/25/2019     03/25/2019    K 4.2 03/25/2019     03/25/2019    CO2 27 03/25/2019    TSH 1.419 04/02/2018    HGBA1C 5.4 04/02/2018    AFP 3.0 03/25/2019       Imaging:   Us 5/23/17:  The liver is normal in size and contour.  Within the left hepatic lobe, likely segment 4B, there is a 3.3 cm mixed echogenicity mass.  No additional hepatic lesions are identified.  The main portal vein demonstrates normal directional flow.    MRI 5/29/17:  Posteriorly in the medial segment of the left lobe of the liver corresponding to the ultrasound findings there is 2.7 x 2.7 x 1.9 cm mass typical of a hemangioma.  It's signal is that of blood pool, and there is a bright peripheral nodular enhancement on the early arterial phase.  Liver is otherwise unremarkable in appearance and spleen, pancreas, adrenal glands, kidneys show no abnormality.      Endoscopy:    None    Assessment:  1. Chronic hepatitis B    2. Liver hemangioma       -  Hepatic hemangioma:  Liver lesion left lobe, 3.8 x 2.3 cm mass has mild increase in size over time.  Will monitor with u/s abd every 12 months.      -  Chronic hep B: is HBeAg negative, HBeAb positive, with HBV DNA of 1,100 IU/mL on 3/25/19.  Thus, this is pre-core mutant/core-promoter mutation hep B.  Currently, HBV DNA <2000 IU/mL, transaminases remain completely normal, therefore, will not start anti-hep B antiviral therapy.  But will do life-long monitoring for flares of hepatitis with CMP and HBV DNA q 6 months.     -  HCC Surveillance:  Due to chronic hep B and being male of  origin, will need life-long monitoring for HCC, starting age 40, currently 43.  Will get AFP every 6 months and U/S every 12 months, and mRI  if change noted in the liver mass or new lesions appear.       -  GB polyps, 4 in number, unchanged in size or number, will monitor with u/s       Recommendations:  -  CMP, HBV DNA quant, AFP every 6 months, next due in Sept 2019.   -  U/S abd limited every 12 months starting March 2020.   -  Return in 6 months - V[facility]yoVisit    Follow up in about 6 months (around 10/2/2019).      Order summary:  No orders of the defined types were placed in this encounter.      Thank you so much for allowing me to participate in the care of Mouna Mccormick MD

## 2019-04-02 NOTE — Clinical Note
Recommendations:-  CMP, HBV DNA quant, AFP every 6 months, next due in Sept 2019. -  U/S abd limited every 12 months starting March 2020. -  Return in 6 months - [facility]yoVisit

## 2019-04-09 ENCOUNTER — DOCUMENTATION ONLY (OUTPATIENT)
Dept: FAMILY MEDICINE | Facility: CLINIC | Age: 44
End: 2019-04-09

## 2019-04-09 NOTE — PROGRESS NOTES
Pre-Visit Chart Review  For Appointment Scheduled on 4/25/19    Health Maintenance Due   Topic Date Due    Pneumococcal Vaccine (Medium Risk) (1 of 1 - PPSV23) 05/18/1994

## 2019-04-25 ENCOUNTER — OFFICE VISIT (OUTPATIENT)
Dept: FAMILY MEDICINE | Facility: CLINIC | Age: 44
End: 2019-04-25
Payer: COMMERCIAL

## 2019-04-25 VITALS
HEART RATE: 71 BPM | WEIGHT: 162.5 LBS | SYSTOLIC BLOOD PRESSURE: 134 MMHG | DIASTOLIC BLOOD PRESSURE: 80 MMHG | TEMPERATURE: 98 F | HEIGHT: 71 IN | BODY MASS INDEX: 22.75 KG/M2

## 2019-04-25 DIAGNOSIS — Z00.00 HEALTHCARE MAINTENANCE: Primary | ICD-10-CM

## 2019-04-25 DIAGNOSIS — D17.1 LIPOMA OF TORSO: ICD-10-CM

## 2019-04-25 DIAGNOSIS — E78.00 PURE HYPERCHOLESTEROLEMIA: ICD-10-CM

## 2019-04-25 PROCEDURE — 90471 PNEUMOCOCCAL POLYSACCHARIDE VACCINE 23-VALENT =>2YO SQ IM: ICD-10-PCS | Mod: S$GLB,,, | Performed by: FAMILY MEDICINE

## 2019-04-25 PROCEDURE — 99999 PR PBB SHADOW E&M-EST. PATIENT-LVL III: ICD-10-PCS | Mod: PBBFAC,,, | Performed by: FAMILY MEDICINE

## 2019-04-25 PROCEDURE — 99396 PREV VISIT EST AGE 40-64: CPT | Mod: 25,S$GLB,, | Performed by: FAMILY MEDICINE

## 2019-04-25 PROCEDURE — 90471 IMMUNIZATION ADMIN: CPT | Mod: S$GLB,,, | Performed by: FAMILY MEDICINE

## 2019-04-25 PROCEDURE — 90732 PPSV23 VACC 2 YRS+ SUBQ/IM: CPT | Mod: S$GLB,,, | Performed by: FAMILY MEDICINE

## 2019-04-25 PROCEDURE — 99396 PR PREVENTIVE VISIT,EST,40-64: ICD-10-PCS | Mod: 25,S$GLB,, | Performed by: FAMILY MEDICINE

## 2019-04-25 PROCEDURE — 99999 PR PBB SHADOW E&M-EST. PATIENT-LVL III: CPT | Mod: PBBFAC,,, | Performed by: FAMILY MEDICINE

## 2019-04-25 PROCEDURE — 90732 PNEUMOCOCCAL POLYSACCHARIDE VACCINE 23-VALENT =>2YO SQ IM: ICD-10-PCS | Mod: S$GLB,,, | Performed by: FAMILY MEDICINE

## 2019-04-29 NOTE — PROGRESS NOTES
Subjective:   Patient ID: Mouna Garcia is a 43 y.o. male     Chief Complaint:Establish Care      Patient here for annual checkup.  Patient overall doing well.  Patient has questions about a lump that he has on his shoulder and posterior thigh.  Otherwise patient doing well.    Review of Systems   Constitutional: Negative for activity change and unexpected weight change.   HENT: Negative for hearing loss, rhinorrhea and trouble swallowing.    Eyes: Negative for discharge and visual disturbance.   Respiratory: Negative for chest tightness and wheezing.    Cardiovascular: Negative for chest pain and palpitations.   Gastrointestinal: Negative for blood in stool, constipation, diarrhea and vomiting.   Endocrine: Negative for polydipsia and polyuria.   Genitourinary: Negative for difficulty urinating, hematuria and urgency.   Musculoskeletal: Negative for arthralgias, joint swelling and neck pain.   Neurological: Negative for weakness and headaches.   Psychiatric/Behavioral: Negative for confusion and dysphoric mood.     Past Medical History:   Diagnosis Date    H pylori ulcer      Objective:     Vitals:    04/25/19 1511   BP: 134/80   Pulse: 71   Temp: 98.1 °F (36.7 °C)     Body mass index is 22.66 kg/m².  Physical Exam   Constitutional: No distress.   HENT:   Head: Normocephalic and atraumatic.   Eyes: EOM are normal.   Cardiovascular: Normal rate and normal heart sounds.   Pulmonary/Chest: Effort normal.   Abdominal: Bowel sounds are normal.   Musculoskeletal: Normal range of motion.   Neurological: He is alert.   Psychiatric: He has a normal mood and affect.     Assessment:     1. Healthcare maintenance    2. Lipoma of torso    3. Pure hypercholesterolemia      Plan:   Healthcare maintenance  Currently patient's health maintenance is up-to-date.  Reviewed blood work from April 2018 shows his LDL cholesterol is within acceptable range.  Regularly for the patient's risk factors.  Otherwise patient overall doing  well.    Lipoma of torso  Patient with lipoma of his shoulder and posterior thigh.  Advised patient to continue to monitor this and if any increased growth to please contact my office immediately but otherwise appears to be benign and would just recommend routine follow-up.    Pure hypercholesterolemia  Currently stable.  Recommend patient have his blood work checked so regularly.  Patient otherwise currently up-to-date on his health maintenance.  Other orders  -     (In Office Administered) Pneumococcal Polysaccharide Vaccine (23 Valent) (SQ/IM)      Time spent with patient: 30 minutes and over half of that time was spent on counseling an coordination of care.    Petr Narayan MD  04/29/2019    Portions of this note have been dictated with LACIE Crandall.

## 2019-06-14 ENCOUNTER — OFFICE VISIT (OUTPATIENT)
Dept: FAMILY MEDICINE | Facility: CLINIC | Age: 44
End: 2019-06-14
Payer: COMMERCIAL

## 2019-06-14 VITALS
TEMPERATURE: 98 F | WEIGHT: 161.63 LBS | SYSTOLIC BLOOD PRESSURE: 132 MMHG | HEART RATE: 65 BPM | DIASTOLIC BLOOD PRESSURE: 76 MMHG | OXYGEN SATURATION: 99 % | BODY MASS INDEX: 22.63 KG/M2 | HEIGHT: 71 IN

## 2019-06-14 DIAGNOSIS — H65.93 MIDDLE EAR EFFUSION, BILATERAL: ICD-10-CM

## 2019-06-14 DIAGNOSIS — K13.70 MOUTH LESION: ICD-10-CM

## 2019-06-14 DIAGNOSIS — R09.81 NASAL CONGESTION: ICD-10-CM

## 2019-06-14 DIAGNOSIS — J06.9 UPPER RESPIRATORY TRACT INFECTION, UNSPECIFIED TYPE: Primary | ICD-10-CM

## 2019-06-14 PROCEDURE — 99214 PR OFFICE/OUTPT VISIT, EST, LEVL IV, 30-39 MIN: ICD-10-PCS | Mod: S$GLB,,, | Performed by: NURSE PRACTITIONER

## 2019-06-14 PROCEDURE — 99999 PR PBB SHADOW E&M-EST. PATIENT-LVL IV: ICD-10-PCS | Mod: PBBFAC,,, | Performed by: NURSE PRACTITIONER

## 2019-06-14 PROCEDURE — 3008F PR BODY MASS INDEX (BMI) DOCUMENTED: ICD-10-PCS | Mod: CPTII,S$GLB,, | Performed by: NURSE PRACTITIONER

## 2019-06-14 PROCEDURE — 99999 PR PBB SHADOW E&M-EST. PATIENT-LVL IV: CPT | Mod: PBBFAC,,, | Performed by: NURSE PRACTITIONER

## 2019-06-14 PROCEDURE — 3008F BODY MASS INDEX DOCD: CPT | Mod: CPTII,S$GLB,, | Performed by: NURSE PRACTITIONER

## 2019-06-14 PROCEDURE — 99214 OFFICE O/P EST MOD 30 MIN: CPT | Mod: S$GLB,,, | Performed by: NURSE PRACTITIONER

## 2019-06-14 RX ORDER — LEVOCETIRIZINE DIHYDROCHLORIDE 5 MG/1
5 TABLET, FILM COATED ORAL NIGHTLY
Qty: 30 TABLET | Refills: 1 | Status: SHIPPED | OUTPATIENT
Start: 2019-06-14 | End: 2021-12-02 | Stop reason: ALTCHOICE

## 2019-06-14 RX ORDER — NYSTATIN 100000 [USP'U]/ML
4 SUSPENSION ORAL 4 TIMES DAILY
Qty: 160 ML | Refills: 0 | Status: SHIPPED | OUTPATIENT
Start: 2019-06-14 | End: 2019-06-24

## 2019-06-14 RX ORDER — FLUTICASONE PROPIONATE 50 MCG
1 SPRAY, SUSPENSION (ML) NASAL DAILY
Qty: 1 BOTTLE | Refills: 0 | Status: SHIPPED | OUTPATIENT
Start: 2019-06-14 | End: 2021-12-02 | Stop reason: ALTCHOICE

## 2019-06-14 NOTE — PROGRESS NOTES
Subjective:       Patient ID: Mouna Garcia is a 44 y.o. male.    Chief Complaint: Nasal Congestion    Patient who is new to me presents with nasal congestion and oral lesion. He feels better today. No fevers. He does have a mouth lesion he wants checked. He states it started the same time as his nasal congestion. He is unsure of any trauma or injury that may have caused it. He denies any history of ETOH use, antibiotics, or smoking.     URI    This is a new problem. The current episode started in the past 7 days. The problem has been gradually improving. There has been no fever. Associated symptoms include congestion, ear pain, rhinorrhea, sinus pain and a sore throat. Pertinent negatives include no abdominal pain, chest pain, diarrhea, dysuria, headaches, nausea, neck pain, rash, swollen glands, vomiting or wheezing. He has tried decongestant (dayquil and nightquil) for the symptoms. The treatment provided mild relief.   Mouth Lesions    The current episode started 3 to 5 days ago. The onset was sudden. The problem has been unchanged. Associated symptoms include congestion, ear pain, mouth sores, rhinorrhea, sore throat and URI. Pertinent negatives include no fever, no abdominal pain, no constipation, no diarrhea, no nausea, no vomiting, no headaches, no hearing loss, no stridor, no swollen glands, no neck pain, no wheezing and no rash. He has been eating and drinking normally.     Review of Systems   Constitutional: Negative for chills, fatigue and fever.   HENT: Positive for congestion, ear pain, mouth sores, postnasal drip, rhinorrhea, sinus pain and sore throat. Negative for hearing loss and sinus pressure.    Respiratory: Negative for shortness of breath, wheezing and stridor.    Cardiovascular: Negative for chest pain and leg swelling.   Gastrointestinal: Negative for abdominal distention, abdominal pain, constipation, diarrhea, nausea and vomiting.   Genitourinary: Negative for dysuria and flank pain.    Musculoskeletal: Negative for neck pain.   Skin: Negative for color change, pallor and rash.   Neurological: Negative for light-headedness, numbness and headaches.       Objective:      Physical Exam   Constitutional: He is oriented to person, place, and time. He appears well-developed and well-nourished.   HENT:   Head: Normocephalic and atraumatic.   Right Ear: External ear normal. A middle ear effusion is present.   Left Ear: External ear normal. A middle ear effusion is present.   Nose: Mucosal edema and rhinorrhea present. Right sinus exhibits no maxillary sinus tenderness and no frontal sinus tenderness. Left sinus exhibits no maxillary sinus tenderness and no frontal sinus tenderness.   Mouth/Throat: Oral lesions present. Posterior oropharyngeal erythema present.       Eyes: Pupils are equal, round, and reactive to light. Conjunctivae and EOM are normal.   Neck: Normal range of motion. Neck supple.   Cardiovascular: Normal rate and regular rhythm.   Pulmonary/Chest: Effort normal and breath sounds normal. He has no decreased breath sounds. He has no wheezes.   Abdominal: Soft. Bowel sounds are normal.   Musculoskeletal: Normal range of motion.   Neurological: He is alert and oriented to person, place, and time.   Skin: Skin is warm and dry.   Nursing note and vitals reviewed.          Assessment:       1. Upper respiratory tract infection, unspecified type    2. Nasal congestion    3. Middle ear effusion, bilateral    4. Mouth lesion        Plan:       Upper respiratory tract infection, unspecified type  -     levocetirizine (XYZAL) 5 MG tablet; Take 1 tablet (5 mg total) by mouth every evening.  Dispense: 30 tablet; Refill: 1  -     fluticasone propionate (FLONASE) 50 mcg/actuation nasal spray; 1 spray (50 mcg total) by Each Nare route once daily.  Dispense: 1 Bottle; Refill: 0    Nasal congestion  -     levocetirizine (XYZAL) 5 MG tablet; Take 1 tablet (5 mg total) by mouth every evening.  Dispense: 30  tablet; Refill: 1  -     fluticasone propionate (FLONASE) 50 mcg/actuation nasal spray; 1 spray (50 mcg total) by Each Nare route once daily.  Dispense: 1 Bottle; Refill: 0    Middle ear effusion, bilateral  -     levocetirizine (XYZAL) 5 MG tablet; Take 1 tablet (5 mg total) by mouth every evening.  Dispense: 30 tablet; Refill: 1  -     fluticasone propionate (FLONASE) 50 mcg/actuation nasal spray; 1 spray (50 mcg total) by Each Nare route once daily.  Dispense: 1 Bottle; Refill: 0    Mouth lesion  -     nystatin (MYCOSTATIN) 100,000 unit/mL suspension; Take 4 mLs (400,000 Units total) by mouth 4 (four) times daily. for 10 days  Dispense: 160 mL; Refill: 0      Discussed mouth lesion is possible yeast as it is easily removed via tongue depressor. Advised good oral care and nystatin for 7-10 days. Follow up in 7 days if no improvement for possible referral to ENT.   Alternate Tylenol and Ibuprofen every 3 hrs  salt water gargles to soothe throat  Honey/lemon water to soothe throat  Cold-eeze helps to reduce the duration of URI symptoms  Elderberry to reduce duration of URI symptoms  Cepachol helps to numb the discomfort in throat  Nasal saline spray reduces inflammation and dryness  Warm face compresses/hot showers as often as you can to open sinuses   Vicks vapor rub at night  Flonase OTC or Nasacort OTC  Simple foods like chicken noodle soup help hydrate  Delsym helps with coughing at night  Zyrtec/Claritin during the day and Benadryl at night may help if allergy component   Zantac will help if there is reflux from the post nasal drip  Rest as much as you can  Your symptoms will likely last 5-7 days, maybe longer depending on how it affects your body. ?You are contagious 5-7, so minimize contact with others to reduce the spread to others and stay home from work or school as we discussed. Dehydration is preventable but is one of the main reasons why you will feel so badly. Drink pedialyte, gatorade or propel. Stay  hydrated. ?Antibiotics are not needed unless a complication(such as Otitis Media, Bacterial sinus infection or pneumonia). Taking antibiotics for Flu/Cold is not supported by evidencebased medicine and can expose you to unnecessary side effects of the medication, such as anaphylaxis.   If you experience any:  Chest pain, shortness of breath, wheezing or difficulty breathing  Severe headache, face, neck or ear pain  New rash  Fever over 101.5º F (38.6 C) for more than three days  Confusion, behavior change or seizure  Severe weakness or dizziness  Go to ER

## 2019-06-14 NOTE — PATIENT INSTRUCTIONS
Candida Infection: Thrush  Thrush is a type of fungal infection in the mouth and throat. Thrush does not usually affect healthy adults. It is more common in people with a weakened immune system. It is also more likely if you take antibiotics. Thrush is normally not contagious.  Understanding fungus in the mouth and throat  Your mouth and throat normally contain millions of tiny organisms. These include bacteria and yeasts. Many of these do not cause any problems. In fact, they may help fight disease.  Yeasts are a type of fungus. A type of yeast called Candida normally lives on your skin. It is also found on the membranes of your mouth and throat. Usually, this yeast grows only in small amounts and is harmless. But in some cases, Candida can grow out of control and cause thrush. Thrush is related to other kinds of Candida infections that can grow all over the body. Thrush refers to an infection of only the mouth and throat.  What causes thrush?  Thrush happens when something lets too much Candida grow inside your mouth and throat. Certain things that change the normal balance of organisms in the mouth can lead to thrush. One example is antibiotic medicine. This medicine may kill some of the normal bacteria in your mouth. Candida can then grow freely. People on antibiotics have an increased risk for thrush.  You have a higher risk for thrush if you:  · Wear dentures  · Are getting chemotherapy  · Are getting radiation therapy  · Have diabetes  · Have a transplanted organ  · Use corticosteroids  · Have a weakened immune system, such as from AIDS  · Are an older adult  Symptoms of thrush  Some people with thrush do not have any symptoms. Symptoms of thrush can include:  · A dry, cottony feeling in your mouth  · Loss of taste  · Pain while eating or swallowing  · White or red patches inside your mouth or on the back of your throat  Diagnosing thrush  Your health care provider will ask about your medical history and  your symptoms. He or she will look closely at your mouth and throat. White or red patches will be scraped with a tongue depressor. The sample will be sent to a lab to test. This test can usually confirm thrush.  If you have thrush, you may also have esophageal candidiasis. This is common in people who have HIV. Your health care provider may check for this condition with an upper endoscopy. This is a procedure to look at the esophagus. A tissue sample may be taken to test.  Treatment for thrush  It is important to treat thrush early. Untreated, it may turn into a more serious form of widespread infection. Thrush is usually treated with antifungal medicine. The medicine is put directly in your mouth and throat. You may be given a swish and swallow medicine or an antifungal lozenge.  In some cases, you may need an antifungal pill. This can remove Candida throughout your body. Or you may need medicine through an IV. These treatments depend on how severe your infection is, and what other health conditions you have.  If you are at high risk for thrush, you may need to keep taking oral antifungal medicine. This is to help prevent thrush in the future.  What happens if you dont get treated for thrush?  If untreated, the Candida may spread throughout your body. They may even enter your bloodstream. This can cause serious problems, such as organ failure and even death. Bloodstream infection may need to be treated with high doses of antifungal medicine through an IV.  Systemic infection is much more likely in people who are very ill. It is also more common in those who have serious problems with their immune system. Additional risk factors for systemic infection in very ill people include:  · Central venous lines  · IV nutrition  · Broad-spectrum antibiotics  · Kidney failure  · Recent surgery  Preventing thrush  You may be able to help prevent some cases of thrush. Make sure to:  · Practice good oral hygiene. Try using a  chlorhexidine mouthwash.  · Clean your dentures regularly as instructed. Make sure they fit you correctly.  · After using a corticosteroid inhaler, rinse out your mouth with water or mouthwash.  · Do not use broad-spectrum antibiotics, if possible.  · Get treated for health problems that increase your risk for thrush, such as diabetes.     When to call the health care provider  Call your health care provider right away if you have any of these:  · Cottony feeling in your mouth  · Loss of taste  · Pain while eating or swallowing  · White or red patches inside your mouth or on the back of your throat   Date Last Reviewed: 3/19/2015  © 6852-6373 [employer]. [address] [place] [address], [place] [address]. All rights reserved. This information is not intended as a substitute for professional medical care. Always follow your healthcare professional's instructions.        Allergic Rhinitis  Allergic rhinitis is an allergic reaction that affects the nose, and often the eyes. Its often known as nasal allergies. Nasal allergies are often due to things in the environment that are breathed in. Depending what you are sensitive to, nasal allergies may occur only during certain seasons. Or they may occur year round. Common indoor allergens include house dust mites, mold, cockroaches, and pet dander. Outdoor allergens include pollen from trees, grasses, and weeds.   Symptoms include a drippy, stuffy, and itchy nose. They also include sneezing and red and itchy eyes. You may feel tired more often. Severe allergies may also affect your breathing and trigger a condition called asthma.   Tests can be done to see what allergens are affecting you. You may be referred to an allergy specialist for testing and further evaluation.  Home care  Your healthcare provider may prescribe medicines to help relieve allergy symptoms. These may include oral medicines, nasal sprays, or eye drops.  Ask your provider for advice on how to avoid  substances that you are allergic to. Below are a few tips for each type of allergen.  Pet dander:  · Do not have pets with fur and feathers.  · If you can't avoid having a pet, keep it out of your bedroom and off upholstered furniture.  Pollen:  · When pollen counts are high, keep windows of your car and home closed. If possible, use an air conditioner instead.  · Wear a filter mask when mowing or doing yard work.  House dust mites:  · Wash bedding every week in warm water and detergent and dry on a hot setting.  · Cover the mattress, box spring, and pillows with allergy covers.   · If possible, sleep in a room with no carpet, curtains, or upholstered furniture.  Cockroaches:  · Store food in sealed containers.  · Remove garbage from the home promptly.  · Fix water leaks  Mold:  · Keep humidity low by using a dehumidifier or air conditioner. Keep the dehumidifier and air conditioner clean and free of mold.  · Clean moldy areas with bleach and water.  In general:  · Vacuum once or twice a week. If possible, use a vacuum with a high-efficiency particulate air (HEPA) filter.  · Do not smoke. Avoid cigarette smoke. Cigarette smoke is an irritant that can make symptoms worse.  Follow-up care  Follow up as advised by the healthcare provider or our staff. If you were referred to an allergy specialist, make this appointment promptly.  When to seek medical advice  Call your healthcare provider right away if the following occur:  · Coughing or wheezing  · Fever greater than 100.4°F (38°C)  · Hives (raised red bumps)  · Continuing symptoms, new symptoms, or worsening symptoms  Call 911 right away if you have:  · Trouble breathing  · Severe swelling of the face or severe itching of the eyes or mouth  Date Last Reviewed: 3/1/2017  © 2533-1664 [employer]. [address] [place] [address], [place] [address]. All rights reserved. This information is not intended as a substitute for professional medical care. Always follow your  healthcare professional's instructions.

## 2019-09-03 ENCOUNTER — TELEPHONE (OUTPATIENT)
Dept: HEPATOLOGY | Facility: CLINIC | Age: 44
End: 2019-09-03

## 2019-09-03 ENCOUNTER — LAB VISIT (OUTPATIENT)
Dept: LAB | Facility: HOSPITAL | Age: 44
End: 2019-09-03
Attending: INTERNAL MEDICINE
Payer: COMMERCIAL

## 2019-09-03 DIAGNOSIS — B18.1 CHRONIC HEPATITIS B: ICD-10-CM

## 2019-09-03 DIAGNOSIS — D18.03 LIVER HEMANGIOMA: ICD-10-CM

## 2019-09-03 DIAGNOSIS — B18.1 CHRONIC HEPATITIS B WITHOUT DELTA AGENT WITHOUT HEPATIC COMA: ICD-10-CM

## 2019-09-03 LAB
BASOPHILS # BLD AUTO: 0.02 K/UL (ref 0–0.2)
BASOPHILS NFR BLD: 0.4 % (ref 0–1.9)
DIFFERENTIAL METHOD: ABNORMAL
EOSINOPHIL # BLD AUTO: 0.1 K/UL (ref 0–0.5)
EOSINOPHIL NFR BLD: 1.2 % (ref 0–8)
ERYTHROCYTE [DISTWIDTH] IN BLOOD BY AUTOMATED COUNT: 13.2 % (ref 11.5–14.5)
HCT VFR BLD AUTO: 44.9 % (ref 40–54)
HGB BLD-MCNC: 13.9 G/DL (ref 14–18)
IMM GRANULOCYTES # BLD AUTO: 0.01 K/UL (ref 0–0.04)
IMM GRANULOCYTES NFR BLD AUTO: 0.2 % (ref 0–0.5)
INR PPP: 1 (ref 0.8–1.2)
LYMPHOCYTES # BLD AUTO: 1.7 K/UL (ref 1–4.8)
LYMPHOCYTES NFR BLD: 33.4 % (ref 18–48)
MCH RBC QN AUTO: 28.8 PG (ref 27–31)
MCHC RBC AUTO-ENTMCNC: 31 G/DL (ref 32–36)
MCV RBC AUTO: 93 FL (ref 82–98)
MONOCYTES # BLD AUTO: 0.3 K/UL (ref 0.3–1)
MONOCYTES NFR BLD: 5.3 % (ref 4–15)
NEUTROPHILS # BLD AUTO: 3 K/UL (ref 1.8–7.7)
NEUTROPHILS NFR BLD: 59.5 % (ref 38–73)
NRBC BLD-RTO: 0 /100 WBC
PLATELET # BLD AUTO: 234 K/UL (ref 150–350)
PMV BLD AUTO: 9.4 FL (ref 9.2–12.9)
PROTHROMBIN TIME: 10 SEC (ref 9–12.5)
RBC # BLD AUTO: 4.83 M/UL (ref 4.6–6.2)
WBC # BLD AUTO: 5.09 K/UL (ref 3.9–12.7)

## 2019-09-03 PROCEDURE — 85025 COMPLETE CBC W/AUTO DIFF WBC: CPT

## 2019-09-03 PROCEDURE — 36415 COLL VENOUS BLD VENIPUNCTURE: CPT | Mod: PO

## 2019-09-03 PROCEDURE — 85610 PROTHROMBIN TIME: CPT

## 2019-09-03 PROCEDURE — 87517 HEPATITIS B DNA QUANT: CPT

## 2019-09-03 NOTE — TELEPHONE ENCOUNTER
----- Message from Griselda Alvarado MD sent at 9/3/2019  3:13 PM CDT -----  Please inform patient results are OK.

## 2019-09-03 NOTE — TELEPHONE ENCOUNTER
----- Message from Lauren Taylor sent at 9/3/2019 12:41 PM CDT -----  Contact: Pt  Calling to schedule a f/u video visit appt    Contact: [phone]

## 2019-09-06 ENCOUNTER — TELEPHONE (OUTPATIENT)
Dept: HEPATOLOGY | Facility: CLINIC | Age: 44
End: 2019-09-06

## 2019-09-06 LAB
HBV DNA SERPL NAA+PROBE-ACNC: 3.06 LOGIU/ML
HBV DNA SERPL NAA+PROBE-LOG IU: 1150 IU/ML

## 2019-09-06 NOTE — TELEPHONE ENCOUNTER
----- Message from Griselda Alvarado MD sent at 9/6/2019  4:29 PM CDT -----  Hep B DNA is low. No treatment recommended at this time.

## 2019-09-19 NOTE — TELEPHONE ENCOUNTER
"Subjective:   Brittany Jacobs SKatelyn Crockett PA-C    "

## 2019-09-20 ENCOUNTER — PATIENT OUTREACH (OUTPATIENT)
Dept: ADMINISTRATIVE | Facility: OTHER | Age: 44
End: 2019-09-20

## 2019-09-24 ENCOUNTER — OFFICE VISIT (OUTPATIENT)
Dept: HEPATOLOGY | Facility: CLINIC | Age: 44
End: 2019-09-24
Payer: COMMERCIAL

## 2019-09-24 DIAGNOSIS — B18.1 CHRONIC HEPATITIS B: Primary | ICD-10-CM

## 2019-09-24 PROCEDURE — 99444 PR PHYSICIAN ONLINE EVALUATION & MANAGEMENT SERVICE: CPT | Mod: 95,,, | Performed by: INTERNAL MEDICINE

## 2019-09-24 PROCEDURE — 99444 PR PHYSICIAN ONLINE EVALUATION & MANAGEMENT SERVICE: ICD-10-PCS | Mod: 95,,, | Performed by: INTERNAL MEDICINE

## 2019-09-24 NOTE — Clinical Note
Recommendations:-  CMP, HBV DNA quant, AFP every 6 months, next due in March 2020. -  U/S abd limited every 12 months starting March 2020. -  Return in 1 yr - [facility]

## 2019-09-24 NOTE — PROGRESS NOTES
[facility] VideoVisit Note    Reason for Consult:  The encounter diagnosis was Chronic hepatitis B.    PCP: Petr Narayan       THIS IS A VIDEOVISIT, THEREFORE, ALL THE COMPONENTS OF PHYSICAL EXAM ARE NOT INCLUDED.      Interval history:  -  Liver mass found on u/s abd and follow-up MRI confirmed mass to be hemangioma, has shown minimal increase in size.    -  Chronic hepatitis B also remains stable with normal transaminases and HBV DNA 1,100 IU/mL.  -  GB polyps - stable.       -  Patient feels fine.  Has no swelling or pain.  Wt stable.      Us 3/25/19:  The liver is normal in size and overall echogenicity.  A hemangioma measuring 3.8 x 3.2 x 2.3 cm is again identified within the lateral segment of the left hepatic lobe which remains unchanged from previous studies.  Approximately 4 gallbladder polyps are present, the largest measuring 6 mm are unchanged.  The bile ducts are not dilated.  Common bile duct diameter is 2.7 mm.  Doppler of the main portal vein confirms a normal waveform and direction of flow.  The spleen and pancreas are normal in size and appearance.  No ascites is present.       MRI 5/29/17:  Posteriorly in the medial segment of the left lobe of the liver corresponding to the ultrasound findings there is 2.7 x 2.7 x 1.9 cm mass typical of a hemangioma.  It's signal is that of blood pool, and there is a bright peripheral nodular enhancement on the early arterial phase.  Liver is otherwise unremarkable in appearance and spleen, pancreas, adrenal glands, kidneys show no abnormality.      MRI 9/16/17:   The liver is normal in size and contour.  Within segment 4A, a lobulated 2.5 cm moderately T2 hyperintense mass is again identified.  The mass demonstrates peripheral nodular enhancement, with progressive centripetal filling on later phase imaging which persists into the delayed phase.  Findings are compatible with a hemangioma.    AFP 3.0, HBV DNA 1150 IU/mL. AST and ALT normal 22,  21.     Chronic hep B is HBeAg negative, HBeAb positive, with HBV DNA of 4,340 IU/mL on 17.  Thus, this is pre-core mutant/core-promoter mutation hep B.  Will need life-long monitoring for flares and HCC.     Patient remains asymptomatic.      HPI:  This is a 44 y.o. male here for evaluation of:  HBsAg positive, HBcore Ab negative. No HBV DNA available. Transaminases normal 23 each, all LFTs normal. Creatinine normal.  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      Just found out of HBsAg positive when donated blood in approx. 2017.  Had HBcAb IgM negative, HAVAb IgM neg, HCV Ab negative.      Patient does not know if mom has hep B, dad is  from brain aneurysm.  Has 3 sisters and 2 brothers from the same mother, not known if any of them have hep B positive result.      Completely asymptomatic.     Elevated liver enzymes: No  Abnormal imaging: liver mass, consistent with hemangioma.  Cirrhosis: No  Hepatitis C: No  Hepatitis B: Yes  Fatty liver: No  Encephalopathy: No  Post-hospital discharge: No  Symptoms: none    Primary hepatic manifestations:  Fatigue:No  Edema:No  Ascites:No  Encephalopathy:No  Abdominal pain:No  GI bleeds: No  Pruritus:No  Weight Changes:No  Changes in Bowel habits: No  Muscle cramps:No    Risk factors for liver disease:  No jaundice  No transfusions  No IVDU  Did not snort cocaine or similar agents  Did not live with anyone with hepatitis B or C  Sexual partner not tested  No hepatotoxic medications  No exposure to industrial toxins  Alcohol: None      ROS:  Constitutional: No fevers, chills, weight changes, fatigue  ENT: No allergies, nosebleeds,   CV: No chest pain  Pulm: No cough, shortness of breath  Ophtho: No vision changes  GI/Liver: see HPI  Derm: No rash, itching  Heme: No swollen glands, bruising  MSK: No joint pains, joint swelling  : No dysuria, hematuria, decrease in urine output  Endo: No hot or cold intolerance  Neuro: No confusion,  disorientation, difficulty with sleep, memory, concentration, syncope, seizure  Psych: No anxiety, depression    Medical History:  has a past medical history of H pylori ulcer.    Surgical History:  has a past surgical history that includes Lymph node dissection (1997).    Family History: family history is not on file..     Social History:  reports that he has never smoked. He has never used smokeless tobacco. He reports that he drinks alcohol. He reports that he does not use drugs.    Review of patient's allergies indicates:   Allergen Reactions    No known drug allergies        Current Outpatient Medications   Medication Sig    fluticasone propionate (FLONASE) 50 mcg/actuation nasal spray 1 spray (50 mcg total) by Each Nare route once daily.    levocetirizine (XYZAL) 5 MG tablet Take 1 tablet (5 mg total) by mouth every evening.    MULTIVITAMIN ORAL Take by mouth Daily.     No current facility-administered medications for this visit.        Objective Findings:    Vital Signs: due to Video Visit, no vitals can be taken for this visit.   There were no vitals taken for this visit.  There is no height or weight on file to calculate BMI.   Patient weighs himself at home, wt between 155-160 lb.      Physical Exam:  General Appearance: Well appearing in no acute distress  Head:   Normocephalic, without obvious abnormality  Eyes:    No scleral icterus, EOMI  ENT: not done due to video visit.   Lungs: not done due to video visit.  Heart:  not done due to video visit.  Abdomen: Based on patient's self exam, abd is Soft, non tender, non distended.   Extremities: no cyanosis, and (based on patient's self-exam) no edema  Skin: No rash  Neurologic: alert, oriented x 3. No asterixis      Labs:  Lab Results   Component Value Date    WBC 5.09 09/03/2019    HGB 13.9 (L) 09/03/2019    HCT 44.9 09/03/2019     09/03/2019    CHOL 228 (H) 04/02/2018    TRIG 128 04/02/2018    HDL 60 04/02/2018    INR 1.0 09/03/2019     CREATININE 0.9 03/25/2019    BUN 15 03/25/2019    BILITOT 0.8 03/25/2019    ALT 21 03/25/2019    AST 22 03/25/2019    ALKPHOS 82 03/25/2019     03/25/2019    K 4.2 03/25/2019     03/25/2019    CO2 27 03/25/2019    TSH 1.419 04/02/2018    HGBA1C 5.4 04/02/2018    AFP 3.0 03/25/2019       Imaging:     Us 3/25/19:  The liver is normal in size and overall echogenicity.  A hemangioma measuring 3.8 x 3.2 x 2.3 cm is again identified within the lateral segment of the left hepatic lobe which remains unchanged from previous studies.  Approximately 4 gallbladder polyps are present, the largest measuring 6 mm are unchanged.  The bile ducts are not dilated.  Common bile duct diameter is 2.7 mm.  Doppler of the main portal vein confirms a normal waveform and direction of flow.  The spleen and pancreas are normal in size and appearance.  No ascites is present.     MRI 5/29/17:  Posteriorly in the medial segment of the left lobe of the liver corresponding to the ultrasound findings there is 2.7 x 2.7 x 1.9 cm mass typical of a hemangioma.  It's signal is that of blood pool, and there is a bright peripheral nodular enhancement on the early arterial phase.  Liver is otherwise unremarkable in appearance and spleen, pancreas, adrenal glands, kidneys show no abnormality.      Endoscopy:    None    Assessment:  1. Chronic hepatitis B       -  Hepatic hemangioma:  Liver lesion left lobe, 3.8 x 2.3 cm mass has mild increase in size over time.  Will monitor with u/s abd every 12 months.      -  Chronic hep B: is HBeAg negative, HBeAb positive, with HBV DNA of 1,150 IU/mL on 9/3/19.  Thus, this is pre-core mutant/core-promoter mutation hep B.  Currently, HBV DNA <2000 IU/mL, transaminases remain completely normal, therefore, will not start anti-hep B antiviral therapy.  But will do life-long monitoring for flares of hepatitis with CMP and HBV DNA q 6 months.     -  HCC Surveillance:  Due to chronic hep B and being male of   origin, will need life-long monitoring for HCC, starting age 40, currently 44.  Will get AFP every 6 months and U/S every 12 months, and mRI if change noted in the liver mass or new lesions appear.       -  GB polyps, 4 in number, unchanged in size or number, will monitor with u/s       Recommendations:  -  CMP, HBV DNA quant, AFP every 6 months, next due in March 2020.   -  U/S abd limited every 12 months starting March 2020.   -  Return in 1 yr - [facility]    Follow up in about 6 months (around 3/24/2020).      Order summary:  No orders of the defined types were placed in this encounter.      Thank you so much for allowing me to participate in the care of Mouna Alysha Alvarado MD

## 2019-09-24 NOTE — PATIENT INSTRUCTIONS
Recommendations:  -  CMP, HBV DNA quant, AFP every 6 months, next due in March 2020.   -  U/S abd limited every 12 months starting March 2020.   -  Return in 1 yr - [facility]

## 2019-10-04 ENCOUNTER — TELEPHONE (OUTPATIENT)
Dept: FAMILY MEDICINE | Facility: CLINIC | Age: 44
End: 2019-10-04

## 2019-10-04 ENCOUNTER — OFFICE VISIT (OUTPATIENT)
Dept: URGENT CARE | Facility: CLINIC | Age: 44
End: 2019-10-04
Payer: COMMERCIAL

## 2019-10-04 VITALS
HEIGHT: 71 IN | RESPIRATION RATE: 16 BRPM | HEART RATE: 71 BPM | WEIGHT: 160 LBS | DIASTOLIC BLOOD PRESSURE: 71 MMHG | SYSTOLIC BLOOD PRESSURE: 127 MMHG | TEMPERATURE: 98 F | BODY MASS INDEX: 22.4 KG/M2 | OXYGEN SATURATION: 98 %

## 2019-10-04 DIAGNOSIS — J40 BRONCHITIS: Primary | ICD-10-CM

## 2019-10-04 PROCEDURE — 99204 PR OFFICE/OUTPT VISIT, NEW, LEVL IV, 45-59 MIN: ICD-10-PCS | Mod: 25,S$GLB,, | Performed by: NURSE PRACTITIONER

## 2019-10-04 PROCEDURE — 3008F PR BODY MASS INDEX (BMI) DOCUMENTED: ICD-10-PCS | Mod: CPTII,S$GLB,, | Performed by: NURSE PRACTITIONER

## 2019-10-04 PROCEDURE — 3008F BODY MASS INDEX DOCD: CPT | Mod: CPTII,S$GLB,, | Performed by: NURSE PRACTITIONER

## 2019-10-04 PROCEDURE — 99204 OFFICE O/P NEW MOD 45 MIN: CPT | Mod: 25,S$GLB,, | Performed by: NURSE PRACTITIONER

## 2019-10-04 RX ORDER — DEXAMETHASONE SODIUM PHOSPHATE 4 MG/ML
8 INJECTION, SOLUTION INTRA-ARTICULAR; INTRALESIONAL; INTRAMUSCULAR; INTRAVENOUS; SOFT TISSUE
Status: SHIPPED | OUTPATIENT
Start: 2019-10-04

## 2019-10-04 RX ORDER — AZITHROMYCIN 250 MG/1
TABLET, FILM COATED ORAL
Qty: 6 TABLET | Refills: 0 | Status: SHIPPED | OUTPATIENT
Start: 2019-10-04 | End: 2021-12-02 | Stop reason: ALTCHOICE

## 2019-10-04 RX ORDER — BENZONATATE 100 MG/1
200 CAPSULE ORAL 3 TIMES DAILY PRN
Qty: 30 CAPSULE | Refills: 1 | Status: SHIPPED | OUTPATIENT
Start: 2019-10-04 | End: 2020-10-03

## 2019-10-04 NOTE — TELEPHONE ENCOUNTER
----- Message from Angela Collier sent at 10/4/2019  7:39 AM CDT -----  Contact: Dena Peck (Spouse)  Type:  Same Day Appointment Request    Caller is requesting a same day appointment.  Caller declined first available appointment listed below.      Name of Caller:  Dena Peck (Spouse)  When is the first available appointment?  10/7/19 with other Providers in the office  Symptoms:  Coughing up blood  Best Call Back Number:    Additional Information:   Calling to schedule a same day appt today, if possible.

## 2019-10-04 NOTE — PATIENT INSTRUCTIONS
Hemoptysis    Hemoptysis is the medical term for coughing up blood. There are many causes for this, including minor illnesses like bronchitis. Hemoptysis can also be an early sign of a more serious illness, like a blood clot in the lung (pulmonary embolism), cancer, tuberculosis, or pneumonia.  Less common causes of hemoptysis can be hard to diagnose in an emergency department or a clinic. More testing will be needed if the symptoms continue.  Home care  · Stay away from cigarette smoke. Smoke irritates the bronchial passages.  · Unless you are taking daily aspirin to prevent stroke or heart attack, do not take aspirin or products that contain aspirin. Aspirin affects how readily the blood clots. Medicines that prevent clotting may make hemoptysis worse.  · If you have a lung infection, drinking extra fluid will help loosen secretions in the lungs.  · Over-the-counter cough medicines that contain dextromethorphan may help reduce coughing. Check with a medical provider before taking dextromethorphan if you have a chronic illness, are pregnant, or take daily medications.  · If you were prescribed an antibiotic, take it until it is all gone. Take it even if you are feeling better after only a few days.  Follow-up care  Follow up with your health care provider, or as advised.  When to seek medical advice  Call your healthcare provider right away if any of these occur:  · Fever of 100.4ºF (38ºC) or higher  Call 911, or get immediate medical care  Call emergency services right away if any of these occur:  · Coughing up an increased amount of blood  · Trouble breathing, wheezing, or pain with breathing  · Chest pain or chest pressure  · Fainting or losing consciousness  · Rapid heartbeat  · Weakness or dizziness  Date Last Reviewed: 9/13/2015  © 7233-7212 [employer]. [address] [place] [address], [place] [address]. All rights reserved. This information is not intended as a substitute for professional medical  care. Always follow your healthcare professional's instructions.

## 2019-10-04 NOTE — TELEPHONE ENCOUNTER
Patient originally has appointment today at 2:40pm with CHONG Anna for coughing with blood tinge sputum. Advised pt to please report to [facility] instead. Provider will out leaving clinic early due to personal emergency. Understanding verbalized. Pt agreed to go to [facility].

## 2019-10-04 NOTE — TELEPHONE ENCOUNTER
Call placed to patient, scheduled first available.   States that he has been coughing a lot at night for over a week, no shortness of breath and no chest pain unless he coughs.   Is able to hold conversation without coughing for this encounter.   Same day appointment scheduled.

## 2019-10-04 NOTE — PROGRESS NOTES
"Subjective:       Patient ID: Mouna Garcia is a 44 y.o. male.    Vitals:  height is 5' 11" (1.803 m) and weight is 72.6 kg (160 lb). His temperature is 97.7 °F (36.5 °C). His blood pressure is 127/71 and his pulse is 71. His respiration is 16 and oxygen saturation is 98%.     Chief Complaint: Cough    Pt presents with productive cough x 1 week with yellow sputum. Pt denies cp or sob. Pt reports mild blood tinge to sputum today. Pt denies f/c/n/v.     Cough   This is a new problem. The current episode started 1 to 4 weeks ago. Episode frequency: at night. The cough is non-productive. Associated symptoms include hemoptysis and postnasal drip. Pertinent negatives include no chills, ear pain, eye redness, fever, myalgias, rash, sore throat, shortness of breath or wheezing. Associated symptoms comments: Congestion  .       Constitution: Negative for chills, sweating, fatigue and fever.   HENT: Positive for postnasal drip. Negative for ear pain, congestion, sinus pain, sinus pressure, sore throat and voice change.    Neck: Negative for painful lymph nodes.   Eyes: Negative for eye redness.   Respiratory: Positive for cough, sputum production and bloody sputum. Negative for chest tightness, COPD, shortness of breath, stridor, wheezing and asthma.    Gastrointestinal: Negative for nausea and vomiting.   Musculoskeletal: Negative for muscle ache.   Skin: Negative for rash.   Allergic/Immunologic: Negative for seasonal allergies and asthma.   Hematologic/Lymphatic: Negative for swollen lymph nodes.       Objective:      Physical Exam   Constitutional: He is oriented to person, place, and time. He appears well-developed and well-nourished. He is cooperative.  Non-toxic appearance. He does not appear ill. No distress.   HENT:   Head: Normocephalic and atraumatic.   Right Ear: Hearing, tympanic membrane, external ear and ear canal normal.   Left Ear: Hearing, tympanic membrane, external ear and ear canal normal.   Nose: Nose " normal. No mucosal edema, rhinorrhea or nasal deformity. No epistaxis. Right sinus exhibits no maxillary sinus tenderness and no frontal sinus tenderness. Left sinus exhibits no maxillary sinus tenderness and no frontal sinus tenderness.   Mouth/Throat: Uvula is midline, oropharynx is clear and moist and mucous membranes are normal. No trismus in the jaw. Normal dentition. No uvula swelling. No posterior oropharyngeal erythema.   Eyes: Conjunctivae and lids are normal. No scleral icterus.   Sclera clear bilat   Neck: Trachea normal, full passive range of motion without pain and phonation normal. Neck supple.   Cardiovascular: Normal rate, regular rhythm, normal heart sounds, intact distal pulses and normal pulses.   Pulmonary/Chest: Effort normal and breath sounds normal. No stridor. No respiratory distress. He has no wheezes. He has no rales. He exhibits no tenderness.   Abdominal: Soft. Normal appearance and bowel sounds are normal. He exhibits no distension. There is no tenderness.   Musculoskeletal: Normal range of motion. He exhibits no edema or deformity.   Neurological: He is alert and oriented to person, place, and time. He exhibits normal muscle tone. Coordination normal.   Skin: Skin is warm, dry and intact. He is not diaphoretic. No pallor.   Psychiatric: He has a normal mood and affect. His speech is normal and behavior is normal. Judgment and thought content normal. Cognition and memory are normal.   Nursing note and vitals reviewed.      Assessment:       1. Bronchitis        Plan:         Bronchitis    Other orders  -     azithromycin (Z-VISHNU) 250 MG tablet; Take 2 tablets by mouth on day 1; Take 1 tablet by mouth on days 2-5  Dispense: 6 tablet; Refill: 0  -     dexamethasone injection 8 mg  -     benzonatate (TESSALON PERLES) 100 MG capsule; Take 2 capsules (200 mg total) by mouth 3 (three) times daily as needed.  Dispense: 30 capsule; Refill: 1

## 2020-02-26 ENCOUNTER — CLINICAL SUPPORT (OUTPATIENT)
Dept: URGENT CARE | Facility: CLINIC | Age: 45
End: 2020-02-26
Payer: COMMERCIAL

## 2020-02-26 VITALS
WEIGHT: 158 LBS | HEART RATE: 57 BPM | OXYGEN SATURATION: 99 % | RESPIRATION RATE: 17 BRPM | BODY MASS INDEX: 22.12 KG/M2 | SYSTOLIC BLOOD PRESSURE: 149 MMHG | TEMPERATURE: 97 F | HEIGHT: 71 IN | DIASTOLIC BLOOD PRESSURE: 91 MMHG

## 2020-02-26 DIAGNOSIS — T14.8XXA PUNCTURE WOUND: Primary | ICD-10-CM

## 2020-02-26 DIAGNOSIS — Z23 NEED FOR TETANUS BOOSTER: ICD-10-CM

## 2020-02-26 PROCEDURE — 90714 PR TD VACCINE 2 PRSRV >/= 7 YO, IM: ICD-10-PCS | Mod: S$GLB,,, | Performed by: NURSE PRACTITIONER

## 2020-02-26 PROCEDURE — 99214 PR OFFICE/OUTPT VISIT, EST, LEVL IV, 30-39 MIN: ICD-10-PCS | Mod: 25,S$GLB,, | Performed by: NURSE PRACTITIONER

## 2020-02-26 PROCEDURE — 90714 TD VACC NO PRESV 7 YRS+ IM: CPT | Mod: S$GLB,,, | Performed by: NURSE PRACTITIONER

## 2020-02-26 PROCEDURE — 99214 OFFICE O/P EST MOD 30 MIN: CPT | Mod: 25,S$GLB,, | Performed by: NURSE PRACTITIONER

## 2020-02-26 PROCEDURE — 90471 IMMUNIZATION ADMIN: CPT | Mod: S$GLB,,, | Performed by: NURSE PRACTITIONER

## 2020-02-26 PROCEDURE — 90471 PR IMMUNIZ ADMIN,1 SINGLE/COMB VAC/TOXOID: ICD-10-PCS | Mod: S$GLB,,, | Performed by: NURSE PRACTITIONER

## 2020-02-26 RX ORDER — MUPIROCIN 20 MG/G
OINTMENT TOPICAL
Qty: 22 G | Refills: 1 | Status: SHIPPED | OUTPATIENT
Start: 2020-02-26 | End: 2021-12-02 | Stop reason: ALTCHOICE

## 2020-02-26 NOTE — PROGRESS NOTES
"Subjective:       Patient ID: Mouna Garcia is a 44 y.o. male.    Vitals:  height is 5' 11" (1.803 m) and weight is 71.7 kg (158 lb). His temperature is 97 °F (36.1 °C). His blood pressure is 149/91 (abnormal) and his pulse is 57 (abnormal). His respiration is 17 and oxygen saturation is 99%.     Chief Complaint: Puncture Wound    Stepped on a nail last night with right foot. States his TD is from around [place], so he would like to get one if possible today. Pt states he was removing crown molding in his house and accidentally stepped on the nail. He cleaned it well and reports very mild discomfort only with palpation. He states nail only superficially punctured toe. He denies FB sensation to area.       Constitution: Negative for chills, fatigue and fever.   HENT: Negative for congestion and sore throat.    Neck: Negative for painful lymph nodes.   Cardiovascular: Negative for chest pain and leg swelling.   Eyes: Negative for double vision and blurred vision.   Respiratory: Negative for cough and shortness of breath.    Gastrointestinal: Negative for nausea, vomiting and diarrhea.   Genitourinary: Negative for dysuria, frequency and urgency.   Musculoskeletal: Negative for joint pain, joint swelling, muscle cramps and muscle ache.   Skin: Positive for wound. Negative for color change, pale and rash.   Allergic/Immunologic: Negative for seasonal allergies.   Neurological: Negative for dizziness, history of vertigo, light-headedness, passing out and headaches.   Hematologic/Lymphatic: Negative for swollen lymph nodes, easy bruising/bleeding and history of blood clots. Does not bruise/bleed easily.   Psychiatric/Behavioral: Negative for nervous/anxious, sleep disturbance and depression. The patient is not nervous/anxious.        Objective:      Physical Exam   Constitutional: He is oriented to person, place, and time. He appears well-developed and well-nourished. He is cooperative.  Non-toxic appearance. He does not " appear ill. No distress.   HENT:   Head: Normocephalic and atraumatic.   Right Ear: Hearing, tympanic membrane, external ear and ear canal normal.   Left Ear: Hearing, tympanic membrane, external ear and ear canal normal.   Nose: Nose normal. No mucosal edema, rhinorrhea or nasal deformity. No epistaxis. Right sinus exhibits no maxillary sinus tenderness and no frontal sinus tenderness. Left sinus exhibits no maxillary sinus tenderness and no frontal sinus tenderness.   Mouth/Throat: Uvula is midline, oropharynx is clear and moist and mucous membranes are normal. No trismus in the jaw. Normal dentition. No uvula swelling. No posterior oropharyngeal erythema.   Eyes: Conjunctivae and lids are normal. Right eye exhibits no discharge. Left eye exhibits no discharge. No scleral icterus.   Neck: Trachea normal, normal range of motion, full passive range of motion without pain and phonation normal. Neck supple.   Cardiovascular: Normal rate, regular rhythm, normal heart sounds, intact distal pulses and normal pulses.   Pulmonary/Chest: Effort normal and breath sounds normal. No respiratory distress.   Abdominal: Soft. Normal appearance and bowel sounds are normal. He exhibits no distension, no pulsatile midline mass and no mass. There is no tenderness.   Musculoskeletal: Normal range of motion. He exhibits no edema or deformity.   Neurological: He is alert and oriented to person, place, and time. He exhibits normal muscle tone. Coordination normal.   Skin: Skin is warm, dry, intact, not diaphoretic and not pale.   Superficial puncture wound to right plantar great toe, no erythema or drainage. No edema.    Psychiatric: He has a normal mood and affect. His speech is normal and behavior is normal. Judgment and thought content normal. Cognition and memory are normal.   Nursing note and vitals reviewed.        Assessment:       1. Puncture wound    2. Need for tetanus booster        Plan:         Puncture wound  -     (In  Office Administered) Tetanus Immune Globulin    Need for tetanus booster    Other orders  -     mupirocin (BACTROBAN) 2 % ointment; Apply to affected area 3 times daily  Dispense: 22 g; Refill: 1    pt advised to soak foot in warm betadine water several times a day and apply bactroban oint to area. RTC or call me if area becomes red or worsens.

## 2020-02-26 NOTE — PATIENT INSTRUCTIONS
Puncture Wound: Foot       A puncture wound occurs when a pointed object (such as a nail) pushes into the skin. It may go into the tissues below the skin of the foot, including fat and muscle. This type of wound is narrow and deep. They can be hard to clean. Puncture wounds are at high risk for becoming infected. One type of serious infection is more likely if you were wearing a rubber-soled shoe at the time of injury. Bacteria from the sole of the shoe may be dragged into the wound. Symptoms of infection may appear as late as 2 to 3 weeks after the injury. Be sure to watch for symptoms of infection and call your healthcare provider right away if any them appear.  X-rays may be done to see whether any objects remain under the skin. Your may also need a tetanus shot. This is given if you are not up to date on this vaccination and the object that caused the wound may lead to tetanus.  Puncture wounds can easily become infected.   Home care  · When you sit or lie down, raise the foot above the level of your heart. This helps reduce swelling and pain.  · Do not put weight on the injured foot if it hurts to do so or if you were told to keep weight off the injury.  · Your healthcare provider may prescribe an antibiotic. This is to help prevent infection. Follow all instructions for taking this medicine. Take the medicine every day until it is gone or you are told to stop. You should not have any left over.  · The healthcare provider may prescribe medicines for pain. Follow instructions for taking them.  · You can take acetaminophen or ibuprofen for pain, unless you were given a different pain medicine to use.   · Follow the healthcare providers instructions on how to care for the wound.  · Keep the wound clean and dry. Do not get the wound wet until you are told it is okay to do so. If the area gets wet, gently pat it dry with a clean cloth. Replace the wet bandage with a dry one.  · If a bandage was applied and it  becomes wet or dirty, replace it. Otherwise, leave it in place for the first 24 hours.  · Once you can get the wound wet, you may shower as usual but do not soak the wound in water (no tub baths or swimming)  · Check the wound daily for symptoms of infection. These include:  ¨ Increasing redness or swelling around the wound  ¨ Increased warmth of the wound  ¨ Worsening pain  ¨ Red streaking lines away from the wound  ¨ Draining pus  Follow-up care  Follow up with your healthcare provider as advised.   When to seek medical advice  Call your healthcare provider right away if any of these occur:  · Any symptoms of infection (listed above)  · Fever of 100.4°F (38.ºC) or higher, or as directed by your healthcare provider  · Wound changes colors  · Numbness around the wound  · Decreased movement around the injured area  Date Last Reviewed: 8/24/2015  © 0275-7729 [employer]. [address] [place] [address], [place] [address]. All rights reserved. This information is not intended as a substitute for professional medical care. Always follow your healthcare professional's instructions.

## 2020-03-06 ENCOUNTER — TELEPHONE (OUTPATIENT)
Dept: HEPATOLOGY | Facility: CLINIC | Age: 45
End: 2020-03-06

## 2020-03-06 NOTE — TELEPHONE ENCOUNTER
----- Message from Rolando Daniels MA sent at 3/6/2020  1:15 PM CST -----      ----- Message -----  From: Simi Oro  Sent: 3/6/2020  11:41 AM CST  To: [facility]al[facility] Staff    Pt is requesting a call back to set up another Virtual Visit appointment with the Doctor.      Please call and advise if possible             Thank you

## 2020-03-09 ENCOUNTER — PATIENT MESSAGE (OUTPATIENT)
Dept: HEPATOLOGY | Facility: CLINIC | Age: 45
End: 2020-03-09

## 2020-03-09 DIAGNOSIS — D18.03 LIVER HEMANGIOMA: ICD-10-CM

## 2020-03-09 DIAGNOSIS — B18.1 CHRONIC HEPATITIS B: Primary | ICD-10-CM

## 2020-03-10 ENCOUNTER — PATIENT MESSAGE (OUTPATIENT)
Dept: HEPATOLOGY | Facility: CLINIC | Age: 45
End: 2020-03-10

## 2020-04-09 ENCOUNTER — PATIENT MESSAGE (OUTPATIENT)
Dept: ADMINISTRATIVE | Facility: HOSPITAL | Age: 45
End: 2020-04-09

## 2020-04-11 ENCOUNTER — PATIENT MESSAGE (OUTPATIENT)
Dept: HEPATOLOGY | Facility: CLINIC | Age: 45
End: 2020-04-11

## 2020-04-13 ENCOUNTER — PATIENT MESSAGE (OUTPATIENT)
Dept: HEPATOLOGY | Facility: CLINIC | Age: 45
End: 2020-04-13

## 2020-04-13 ENCOUNTER — TELEPHONE (OUTPATIENT)
Dept: HEPATOLOGY | Facility: CLINIC | Age: 45
End: 2020-04-13

## 2020-04-13 NOTE — TELEPHONE ENCOUNTER
----- Message from Griselda Alvarado MD sent at 4/9/2020  6:09 PM CDT -----  schedule after May when villalobos gone  ----- Message -----  From: Tyrel Hayward, RT  Sent: 4/9/2020   1:32 PM CDT  To: Griselda Alvarado MD, Jaquelin Rose, RT    Due to the Governor's stay-home order being extended through April 30, outpatient imaging needed prior to May 1st will need to be ordered STAT/ASAP in order to be scheduled.  All outpatient routine radiology visits may only be scheduled after May 1, 2020.  Local sites will work with ordering providers to review non-STAT orders between April 22 - May 1 later in the month.  Is it okay with you if we reschedule this patient to after May 1, 2020.

## 2020-04-30 ENCOUNTER — OFFICE VISIT (OUTPATIENT)
Dept: FAMILY MEDICINE | Facility: CLINIC | Age: 45
End: 2020-04-30
Payer: COMMERCIAL

## 2020-04-30 DIAGNOSIS — Z00.00 HEALTHCARE MAINTENANCE: Primary | ICD-10-CM

## 2020-04-30 PROCEDURE — 99396 PREV VISIT EST AGE 40-64: CPT | Mod: 95,,, | Performed by: FAMILY MEDICINE

## 2020-04-30 PROCEDURE — 99396 PR PREVENTIVE VISIT,EST,40-64: ICD-10-PCS | Mod: 95,,, | Performed by: FAMILY MEDICINE

## 2020-05-04 NOTE — PROGRESS NOTES
The patient location is:  [place]  The chief complaint leading to consultation is: Follow-up    Visit type: Virtual visit with synchronous audio and video  Total time spent with patient:  15 minutes   Each patient to whom he or she provides medical services by telemedicine is:  (1) informed of the relationship between the physician and patient and the respective role of any other health care provider with respect to management of the patient; and (2) notified that he or she may decline to receive medical services by telemedicine and may withdraw from such care at any time.    Notes:  See below    Subjective:   Patient ID: Mouna Garcia is a 44 y.o. male     Chief Complaint:Follow-up      Patient doing well no complaints    Review of Systems   Constitutional: Negative for activity change and unexpected weight change.   HENT: Negative for hearing loss, rhinorrhea and trouble swallowing.    Eyes: Negative for discharge and visual disturbance.   Respiratory: Negative for chest tightness and wheezing.    Cardiovascular: Negative for chest pain and palpitations.   Gastrointestinal: Negative for blood in stool, constipation, diarrhea and vomiting.   Endocrine: Negative for polydipsia and polyuria.   Genitourinary: Negative for difficulty urinating, hematuria and urgency.   Musculoskeletal: Negative for arthralgias, joint swelling and neck pain.   Neurological: Negative for weakness and headaches.   Psychiatric/Behavioral: Negative for confusion and dysphoric mood.     Past Medical History:   Diagnosis Date    H pylori ulcer      Objective:   There were no vitals filed for this visit.  There is no height or weight on file to calculate BMI.  Physical Exam   Constitutional: He is oriented to person, place, and time. He appears well-developed and well-nourished.   HENT:   Head: Normocephalic and atraumatic.   Eyes: Conjunctivae are normal.   Pulmonary/Chest: Effort normal. No respiratory distress.   Musculoskeletal: Normal  range of motion.   Neurological: He is alert and oriented to person, place, and time.   Skin: No pallor.   Psychiatric: He has a normal mood and affect. His behavior is normal. Judgment and thought content normal.     Assessment:     1. Healthcare maintenance      Plan:   Healthcare maintenance  -     Lipid panel; Future; Expected date: 04/30/2020            Time spent with patient: 15 minutes and over half of that time was spent on counseling an coordination of care.    Petr Narayan MD  05/04/2020    Portions of this note have been dictated with LACIE Freeman

## 2020-05-29 ENCOUNTER — HOSPITAL ENCOUNTER (OUTPATIENT)
Dept: RADIOLOGY | Facility: CLINIC | Age: 45
Discharge: HOME | End: 2020-05-29
Attending: INTERNAL MEDICINE
Payer: COMMERCIAL

## 2020-05-29 ENCOUNTER — TELEPHONE (OUTPATIENT)
Dept: HEPATOLOGY | Facility: CLINIC | Age: 45
End: 2020-05-29

## 2020-05-29 DIAGNOSIS — D18.03 LIVER HEMANGIOMA: ICD-10-CM

## 2020-05-29 DIAGNOSIS — B18.1 CHRONIC HEPATITIS B: ICD-10-CM

## 2020-05-29 PROCEDURE — 76700 US ABDOMEN COMPLETE: ICD-10-PCS | Mod: 26,,, | Performed by: RADIOLOGY

## 2020-05-29 PROCEDURE — 76700 US EXAM ABDOM COMPLETE: CPT | Mod: 26,,, | Performed by: RADIOLOGY

## 2020-05-29 PROCEDURE — 76700 US EXAM ABDOM COMPLETE: CPT | Mod: TC,PO

## 2020-05-29 NOTE — TELEPHONE ENCOUNTER
----- Message from Marco Antonio Delaney sent at 5/29/2020  1:17 PM CDT -----  Contact: Asia fitzgerald at [facility]  Calling to speak with  or nurse    Call back: [phone]

## 2020-05-29 NOTE — TELEPHONE ENCOUNTER
----- Message from Griselda Alvarado MD sent at 5/29/2020  2:55 PM CDT -----  Stable 3.6 x 2.0 x 3.5 cm hemangioma in the liver.  Gallbladder: 3 polyps, largest measured 5 mm.     No worries.   Pl inform patient

## 2020-05-29 NOTE — TELEPHONE ENCOUNTER
----- Message from Griselda Alvarado MD sent at 5/29/2020  2:53 PM CDT -----  Please inform patient results are OK.

## 2020-05-29 NOTE — TELEPHONE ENCOUNTER
Tried to call [facility] back on the phone number below but cannot connect the call. The ext said dial the direct line.

## 2020-06-04 ENCOUNTER — TELEPHONE (OUTPATIENT)
Dept: HEPATOLOGY | Facility: CLINIC | Age: 45
End: 2020-06-04

## 2020-06-04 NOTE — TELEPHONE ENCOUNTER
----- Message from Griselda Alvarado MD sent at 6/4/2020  1:52 PM CDT -----  Hep B viral load is still low, no treatment recommended at this time.

## 2020-07-13 ENCOUNTER — TELEPHONE (OUTPATIENT)
Dept: HEPATOLOGY | Facility: CLINIC | Age: 45
End: 2020-07-13

## 2020-07-16 ENCOUNTER — OFFICE VISIT (OUTPATIENT)
Dept: HEPATOLOGY | Facility: CLINIC | Age: 45
End: 2020-07-16
Payer: COMMERCIAL

## 2020-07-16 ENCOUNTER — TELEPHONE (OUTPATIENT)
Dept: HEPATOLOGY | Facility: CLINIC | Age: 45
End: 2020-07-16

## 2020-07-16 DIAGNOSIS — K82.4 GALL BLADDER POLYP: ICD-10-CM

## 2020-07-16 DIAGNOSIS — B18.1 CHRONIC HEPATITIS B: Primary | ICD-10-CM

## 2020-07-16 DIAGNOSIS — D18.03 LIVER HEMANGIOMA: ICD-10-CM

## 2020-07-16 PROCEDURE — 99213 PR OFFICE/OUTPT VISIT, EST, LEVL III, 20-29 MIN: ICD-10-PCS | Mod: 95,,, | Performed by: INTERNAL MEDICINE

## 2020-07-16 PROCEDURE — 99213 OFFICE O/P EST LOW 20 MIN: CPT | Mod: 95,,, | Performed by: INTERNAL MEDICINE

## 2020-07-16 NOTE — PATIENT INSTRUCTIONS
Recommendations:  -  CMP, HBV DNA quant, AFP every 6 months, next due in November 29, 2020.   -  U/S abd limited every 12 months starting November 29, 2020.   -  Return in 1 yr - [facility]

## 2020-07-16 NOTE — PROGRESS NOTES
[facility] VideoVisit Note    Reason for Consult:  There were no encounter diagnoses.    PCP: Petr Narayan       THIS IS A VIDEOVISIT, THEREFORE, ALL THE COMPONENTS OF PHYSICAL EXAM ARE NOT INCLUDED.      Interval history:  -  Liver mass found on u/s abd and follow-up MRI confirmed mass to be hemangioma, has shown minimal increase in size.    -  Chronic hepatitis B also remains stable with normal transaminases and HBV DNA 1,100 IU/mL.  -  GB polyps - stable.       -  Patient feels fine.  Has no swelling or pain.  Wt stable.      Us 3/25/19:  The liver is normal in size and overall echogenicity.  A hemangioma measuring 3.8 x 3.2 x 2.3 cm is again identified within the lateral segment of the left hepatic lobe which remains unchanged from previous studies.  Approximately 4 gallbladder polyps are present, the largest measuring 6 mm are unchanged.  The bile ducts are not dilated.  Common bile duct diameter is 2.7 mm.  Doppler of the main portal vein confirms a normal waveform and direction of flow.  The spleen and pancreas are normal in size and appearance.  No ascites is present.       MRI 5/29/17:  Posteriorly in the medial segment of the left lobe of the liver corresponding to the ultrasound findings there is 2.7 x 2.7 x 1.9 cm mass typical of a hemangioma.  It's signal is that of blood pool, and there is a bright peripheral nodular enhancement on the early arterial phase.  Liver is otherwise unremarkable in appearance and spleen, pancreas, adrenal glands, kidneys show no abnormality.      MRI 9/16/17:   The liver is normal in size and contour.  Within segment 4A, a lobulated 2.5 cm moderately T2 hyperintense mass is again identified.  The mass demonstrates peripheral nodular enhancement, with progressive centripetal filling on later phase imaging which persists into the delayed phase.  Findings are compatible with a hemangioma.    AFP 3.0, HBV DNA 1150 IU/mL. AST and ALT normal 22, 21.      Chronic hep B is HBeAg negative, HBeAb positive, with HBV DNA of 4,340 IU/mL on 17.  Thus, this is pre-core mutant/core-promoter mutation hep B.  Will need life-long monitoring for flares and HCC.     Patient remains asymptomatic.      HPI:  This is a 45 y.o. male here for evaluation of:  HBsAg positive, HBcore Ab negative. No HBV DNA available. Transaminases normal 23 each, all LFTs normal. Creatinine normal.  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      Just found out of HBsAg positive when donated blood in approx. 2017.  Had HBcAb IgM negative, HAVAb IgM neg, HCV Ab negative.      Patient does not know if mom has hep B, dad is  from brain aneurysm.  Has 3 sisters and 2 brothers from the same mother, not known if any of them have hep B positive result.      Completely asymptomatic.     Elevated liver enzymes: No  Abnormal imaging: liver mass, consistent with hemangioma.  Cirrhosis: No  Hepatitis C: No  Hepatitis B: Yes  Fatty liver: No  Encephalopathy: No  Post-hospital discharge: No  Symptoms: none    Primary hepatic manifestations:  Fatigue:No  Edema:No  Ascites:No  Encephalopathy:No  Abdominal pain:No  GI bleeds: No  Pruritus:No  Weight Changes:No  Changes in Bowel habits: No  Muscle cramps:No    Risk factors for liver disease:  No jaundice  No transfusions  No IVDU  Did not snort cocaine or similar agents  Did not live with anyone with hepatitis B or C  Sexual partner not tested  No hepatotoxic medications  No exposure to industrial toxins  Alcohol: None      ROS:  Constitutional: No fevers, chills, weight changes, fatigue  ENT: No allergies, nosebleeds,   CV: No chest pain  Pulm: No cough, shortness of breath  Ophtho: No vision changes  GI/Liver: see HPI  Derm: No rash, itching  Heme: No swollen glands, bruising  MSK: No joint pains, joint swelling  : No dysuria, hematuria, decrease in urine output  Endo: No hot or cold intolerance  Neuro: No confusion,  disorientation, difficulty with sleep, memory, concentration, syncope, seizure  Psych: No anxiety, depression    Medical History:  has a past medical history of H pylori ulcer.    Surgical History:  has a past surgical history that includes Lymph node dissection (1997).    Family History: family history is not on file..     Social History:  reports that he has never smoked. He has never used smokeless tobacco. He reports current alcohol use. He reports that he does not use drugs.    Review of patient's allergies indicates:   Allergen Reactions    No known drug allergies        Current Outpatient Medications   Medication Sig    MULTIVITAMIN ORAL Take by mouth Daily.    azithromycin (Z-VISHNU) 250 MG tablet Take 2 tablets by mouth on day 1; Take 1 tablet by mouth on days 2-5 (Patient not taking: Reported on 2/26/2020)    benzonatate (TESSALON PERLES) 100 MG capsule Take 2 capsules (200 mg total) by mouth 3 (three) times daily as needed. (Patient not taking: Reported on 2/26/2020)    fluticasone propionate (FLONASE) 50 mcg/actuation nasal spray 1 spray (50 mcg total) by Each Nare route once daily. (Patient not taking: Reported on 2/26/2020)    levocetirizine (XYZAL) 5 MG tablet Take 1 tablet (5 mg total) by mouth every evening. (Patient not taking: Reported on 2/26/2020)    mupirocin (BACTROBAN) 2 % ointment Apply to affected area 3 times daily     Current Facility-Administered Medications   Medication    dexamethasone injection 8 mg       Objective Findings:    Vital Signs: due to Video Visit, no vitals can be taken for this visit.   There were no vitals taken for this visit.  There is no height or weight on file to calculate BMI.   Patient weighs himself at home, wt between 155-160 lb.      Physical Exam:  General Appearance: Well appearing in no acute distress  Head:   Normocephalic, without obvious abnormality  Eyes:    No scleral icterus, EOMI  ENT: not done due to video visit.   Lungs: not done due to video  visit.  Heart:  not done due to video visit.  Abdomen: Based on patient's self exam, abd is Soft, non tender, non distended.   Extremities: no cyanosis, and (based on patient's self-exam) no edema  Skin: No rash  Neurologic: alert, oriented x 3. No asterixis      Labs:  Lab Results   Component Value Date    WBC 3.56 (L) 05/29/2020    HGB 13.7 (L) 05/29/2020    HCT 44.1 05/29/2020     05/29/2020    CHOL 201 (H) 05/29/2020    TRIG 86 05/29/2020    HDL 54 05/29/2020    INR 1.0 05/29/2020    CREATININE 0.9 05/29/2020    BUN 11 05/29/2020    BILITOT 0.9 05/29/2020    ALT 24 05/29/2020    AST 20 05/29/2020    ALKPHOS 66 05/29/2020     05/29/2020    K 4.3 05/29/2020     05/29/2020    CO2 29 05/29/2020    TSH 1.419 04/02/2018    HGBA1C 5.4 04/02/2018    AFP 2.7 05/29/2020       Imaging:     Us 3/25/19:  The liver is normal in size and overall echogenicity.  A hemangioma measuring 3.8 x 3.2 x 2.3 cm is again identified within the lateral segment of the left hepatic lobe which remains unchanged from previous studies.  Approximately 4 gallbladder polyps are present, the largest measuring 6 mm are unchanged.  The bile ducts are not dilated.  Common bile duct diameter is 2.7 mm.  Doppler of the main portal vein confirms a normal waveform and direction of flow.  The spleen and pancreas are normal in size and appearance.  No ascites is present.     MRI 5/29/17:  Posteriorly in the medial segment of the left lobe of the liver corresponding to the ultrasound findings there is 2.7 x 2.7 x 1.9 cm mass typical of a hemangioma.  It's signal is that of blood pool, and there is a bright peripheral nodular enhancement on the early arterial phase.  Liver is otherwise unremarkable in appearance and spleen, pancreas, adrenal glands, kidneys show no abnormality.      US 5/29/20:  Liver: The liver is normal in size and echotexture measuring 14 cm in greatest longitudinal dimension.  There is a stable 3.6 x 2.0 x 3.5 cm  hemangioma in the lateral segment of the left lobe of the liver.  There is no intrahepatic biliary ductal dilatation.  The hepatorenal index is normal at 0.92.  There is normal directional flow in the main portal vein.         Endoscopy:    None    Assessment:  No diagnosis found.   -  Hepatic hemangioma:  Liver lesion left lobe, 3.6 x 2.0 x 3.5 cm mass has mild decrease in size over time.  Will monitor with u/s abd every 12 months.      -  Chronic hep B: is HBeAg negative, HBeAb positive, with HBV DNA of 1,150 IU/mL on 9/3/19.  Thus, this is pre-core mutant/core-promoter mutation hep B.  Currently, HBV DNA <2000 IU/mL, transaminases remain completely normal, therefore, will not start anti-hep B antiviral therapy.  But will do life-long monitoring for flares of hepatitis with CMP and HBV DNA q 6 months.     -  HCC Surveillance:  Due to chronic hep B and being male of  origin, will need life-long monitoring for HCC, starting age 40, currently 44.  Will get AFP every 6 months and U/S every 12 months, and mRI if change noted in the liver mass or new lesions appear.       -  GB polyps, 4 in number, unchanged in size or number, will monitor with u/s     Recommendations:  -  CMP, HBV DNA quant, AFP every 6 months, next due in November 29, 2020.   -  U/S abd limited every 12 months starting November 29, 2020.   -  Return in 1 yr - [facility]yoVisit    Follow up in about 1 year (around 7/16/2021).      Order summary:  No orders of the defined types were placed in this encounter.      Thank you so much for allowing me to participate in the care of Mouna Mccormick MD

## 2020-12-18 ENCOUNTER — LAB VISIT (OUTPATIENT)
Dept: LAB | Facility: HOSPITAL | Age: 45
End: 2020-12-18
Attending: INTERNAL MEDICINE
Payer: COMMERCIAL

## 2020-12-18 DIAGNOSIS — B18.1 CHRONIC HEPATITIS B: ICD-10-CM

## 2020-12-18 DIAGNOSIS — D18.03 LIVER HEMANGIOMA: ICD-10-CM

## 2020-12-18 LAB
ALBUMIN SERPL BCP-MCNC: 4.7 G/DL (ref 3.5–5.2)
ALP SERPL-CCNC: 69 U/L (ref 55–135)
ALT SERPL W/O P-5'-P-CCNC: 28 U/L (ref 10–44)
ANION GAP SERPL CALC-SCNC: 9 MMOL/L (ref 8–16)
AST SERPL-CCNC: 24 U/L (ref 10–40)
BASOPHILS # BLD AUTO: 0.03 K/UL (ref 0–0.2)
BASOPHILS NFR BLD: 0.7 % (ref 0–1.9)
BILIRUB SERPL-MCNC: 1 MG/DL (ref 0.1–1)
BUN SERPL-MCNC: 10 MG/DL (ref 6–20)
CALCIUM SERPL-MCNC: 9.6 MG/DL (ref 8.7–10.5)
CHLORIDE SERPL-SCNC: 104 MMOL/L (ref 95–110)
CO2 SERPL-SCNC: 27 MMOL/L (ref 23–29)
CREAT SERPL-MCNC: 0.8 MG/DL (ref 0.5–1.4)
DIFFERENTIAL METHOD: NORMAL
EOSINOPHIL # BLD AUTO: 0 K/UL (ref 0–0.5)
EOSINOPHIL NFR BLD: 0.5 % (ref 0–8)
ERYTHROCYTE [DISTWIDTH] IN BLOOD BY AUTOMATED COUNT: 12.4 % (ref 11.5–14.5)
EST. GFR  (AFRICAN AMERICAN): >60 ML/MIN/1.73 M^2
EST. GFR  (NON AFRICAN AMERICAN): >60 ML/MIN/1.73 M^2
GLUCOSE SERPL-MCNC: 87 MG/DL (ref 70–110)
HCT VFR BLD AUTO: 44.4 % (ref 40–54)
HGB BLD-MCNC: 14.2 G/DL (ref 14–18)
IMM GRANULOCYTES # BLD AUTO: 0.01 K/UL (ref 0–0.04)
IMM GRANULOCYTES NFR BLD AUTO: 0.2 % (ref 0–0.5)
INR PPP: 0.9 (ref 0.8–1.2)
LYMPHOCYTES # BLD AUTO: 1.7 K/UL (ref 1–4.8)
LYMPHOCYTES NFR BLD: 37.7 % (ref 18–48)
MCH RBC QN AUTO: 29 PG (ref 27–31)
MCHC RBC AUTO-ENTMCNC: 32 G/DL (ref 32–36)
MCV RBC AUTO: 91 FL (ref 82–98)
MONOCYTES # BLD AUTO: 0.3 K/UL (ref 0.3–1)
MONOCYTES NFR BLD: 7.1 % (ref 4–15)
NEUTROPHILS # BLD AUTO: 2.4 K/UL (ref 1.8–7.7)
NEUTROPHILS NFR BLD: 53.8 % (ref 38–73)
NRBC BLD-RTO: 0 /100 WBC
PLATELET # BLD AUTO: 204 K/UL (ref 150–350)
PMV BLD AUTO: 9.7 FL (ref 9.2–12.9)
POTASSIUM SERPL-SCNC: 4.4 MMOL/L (ref 3.5–5.1)
PROT SERPL-MCNC: 8.1 G/DL (ref 6–8.4)
PROTHROMBIN TIME: 10.5 SEC (ref 9–12.5)
RBC # BLD AUTO: 4.89 M/UL (ref 4.6–6.2)
SODIUM SERPL-SCNC: 140 MMOL/L (ref 136–145)
WBC # BLD AUTO: 4.38 K/UL (ref 3.9–12.7)

## 2020-12-18 PROCEDURE — 85025 COMPLETE CBC W/AUTO DIFF WBC: CPT

## 2020-12-18 PROCEDURE — 82105 ALPHA-FETOPROTEIN SERUM: CPT

## 2020-12-18 PROCEDURE — 87517 HEPATITIS B DNA QUANT: CPT

## 2020-12-18 PROCEDURE — 80053 COMPREHEN METABOLIC PANEL: CPT

## 2020-12-18 PROCEDURE — 36415 COLL VENOUS BLD VENIPUNCTURE: CPT | Mod: PO

## 2020-12-18 PROCEDURE — 85610 PROTHROMBIN TIME: CPT

## 2020-12-19 LAB — AFP SERPL-MCNC: 2.9 NG/ML (ref 0–8.4)

## 2020-12-22 ENCOUNTER — TELEPHONE (OUTPATIENT)
Dept: HEPATOLOGY | Facility: CLINIC | Age: 45
End: 2020-12-22

## 2020-12-28 ENCOUNTER — PATIENT MESSAGE (OUTPATIENT)
Dept: HEPATOLOGY | Facility: CLINIC | Age: 45
End: 2020-12-28

## 2020-12-28 ENCOUNTER — TELEPHONE (OUTPATIENT)
Dept: HEPATOLOGY | Facility: CLINIC | Age: 45
End: 2020-12-28

## 2020-12-28 LAB
HBV DNA SERPL NAA+PROBE-ACNC: ABNORMAL IU/ML
HBV DNA SERPL NAA+PROBE-LOG IU: 3.04 LOG (10) IU/ML
HBV DNA SERPL QL NAA+PROBE: DETECTED

## 2020-12-28 NOTE — TELEPHONE ENCOUNTER
----- Message from Griselda Alvarado MD sent at 12/28/2020  3:56 PM CST -----  Please inform patient results are OK.

## 2021-04-05 ENCOUNTER — PATIENT MESSAGE (OUTPATIENT)
Dept: HEPATOLOGY | Facility: CLINIC | Age: 46
End: 2021-04-05

## 2021-05-06 ENCOUNTER — PATIENT MESSAGE (OUTPATIENT)
Dept: RESEARCH | Facility: HOSPITAL | Age: 46
End: 2021-05-06

## 2021-05-22 ENCOUNTER — IMMUNIZATION (OUTPATIENT)
Dept: PRIMARY CARE CLINIC | Facility: CLINIC | Age: 46
End: 2021-05-22
Payer: COMMERCIAL

## 2021-05-22 DIAGNOSIS — Z23 NEED FOR VACCINATION: Primary | ICD-10-CM

## 2021-05-22 PROCEDURE — 0001A COVID-19, MRNA, LNP-S, PF, 30 MCG/0.3 ML DOSE VACCINE: ICD-10-PCS | Mod: CV19,S$GLB,, | Performed by: FAMILY MEDICINE

## 2021-05-22 PROCEDURE — 0001A COVID-19, MRNA, LNP-S, PF, 30 MCG/0.3 ML DOSE VACCINE: CPT | Mod: CV19,S$GLB,, | Performed by: FAMILY MEDICINE

## 2021-05-22 PROCEDURE — 91300 COVID-19, MRNA, LNP-S, PF, 30 MCG/0.3 ML DOSE VACCINE: ICD-10-PCS | Mod: S$GLB,,, | Performed by: FAMILY MEDICINE

## 2021-05-22 PROCEDURE — 91300 COVID-19, MRNA, LNP-S, PF, 30 MCG/0.3 ML DOSE VACCINE: CPT | Mod: S$GLB,,, | Performed by: FAMILY MEDICINE

## 2021-06-14 ENCOUNTER — IMMUNIZATION (OUTPATIENT)
Dept: PRIMARY CARE CLINIC | Facility: CLINIC | Age: 46
End: 2021-06-14
Payer: COMMERCIAL

## 2021-06-14 DIAGNOSIS — Z23 NEED FOR VACCINATION: Primary | ICD-10-CM

## 2021-06-14 PROCEDURE — 91300 COVID-19, MRNA, LNP-S, PF, 30 MCG/0.3 ML DOSE VACCINE: CPT | Mod: S$GLB,,, | Performed by: FAMILY MEDICINE

## 2021-06-14 PROCEDURE — 0002A COVID-19, MRNA, LNP-S, PF, 30 MCG/0.3 ML DOSE VACCINE: ICD-10-PCS | Mod: CV19,S$GLB,, | Performed by: FAMILY MEDICINE

## 2021-06-14 PROCEDURE — 0002A COVID-19, MRNA, LNP-S, PF, 30 MCG/0.3 ML DOSE VACCINE: CPT | Mod: CV19,S$GLB,, | Performed by: FAMILY MEDICINE

## 2021-06-14 PROCEDURE — 91300 COVID-19, MRNA, LNP-S, PF, 30 MCG/0.3 ML DOSE VACCINE: ICD-10-PCS | Mod: S$GLB,,, | Performed by: FAMILY MEDICINE

## 2021-07-06 ENCOUNTER — PATIENT MESSAGE (OUTPATIENT)
Dept: ADMINISTRATIVE | Facility: HOSPITAL | Age: 46
End: 2021-07-06

## 2021-08-24 ENCOUNTER — OFFICE VISIT (OUTPATIENT)
Dept: HEPATOLOGY | Facility: CLINIC | Age: 46
End: 2021-08-24
Payer: COMMERCIAL

## 2021-08-24 DIAGNOSIS — B18.1 CHRONIC HEPATITIS B: ICD-10-CM

## 2021-08-24 DIAGNOSIS — K82.4 GALLBLADDER POLYP: Primary | ICD-10-CM

## 2021-08-24 PROCEDURE — 99213 OFFICE O/P EST LOW 20 MIN: CPT | Mod: 95,,, | Performed by: INTERNAL MEDICINE

## 2021-08-24 PROCEDURE — 99213 PR OFFICE/OUTPT VISIT, EST, LEVL III, 20-29 MIN: ICD-10-PCS | Mod: 95,,, | Performed by: INTERNAL MEDICINE

## 2021-09-07 ENCOUNTER — TELEPHONE (OUTPATIENT)
Dept: HEPATOLOGY | Facility: CLINIC | Age: 46
End: 2021-09-07

## 2021-12-02 ENCOUNTER — OFFICE VISIT (OUTPATIENT)
Dept: FAMILY MEDICINE | Facility: CLINIC | Age: 46
End: 2021-12-02
Payer: COMMERCIAL

## 2021-12-02 VITALS
OXYGEN SATURATION: 98 % | TEMPERATURE: 98 F | WEIGHT: 161.81 LBS | DIASTOLIC BLOOD PRESSURE: 84 MMHG | BODY MASS INDEX: 22.65 KG/M2 | HEIGHT: 71 IN | HEART RATE: 80 BPM | SYSTOLIC BLOOD PRESSURE: 126 MMHG

## 2021-12-02 DIAGNOSIS — E78.00 PURE HYPERCHOLESTEROLEMIA: Primary | ICD-10-CM

## 2021-12-02 PROCEDURE — 99999 PR PBB SHADOW E&M-EST. PATIENT-LVL III: CPT | Mod: PBBFAC,,, | Performed by: FAMILY MEDICINE

## 2021-12-02 PROCEDURE — 99396 PREV VISIT EST AGE 40-64: CPT | Mod: S$GLB,,, | Performed by: FAMILY MEDICINE

## 2021-12-02 PROCEDURE — 99396 PR PREVENTIVE VISIT,EST,40-64: ICD-10-PCS | Mod: S$GLB,,, | Performed by: FAMILY MEDICINE

## 2021-12-02 PROCEDURE — 99999 PR PBB SHADOW E&M-EST. PATIENT-LVL III: ICD-10-PCS | Mod: PBBFAC,,, | Performed by: FAMILY MEDICINE

## 2021-12-13 ENCOUNTER — HOSPITAL ENCOUNTER (OUTPATIENT)
Dept: RADIOLOGY | Facility: HOSPITAL | Age: 46
Discharge: HOME | End: 2021-12-13
Attending: INTERNAL MEDICINE
Payer: COMMERCIAL

## 2021-12-13 DIAGNOSIS — K82.4 GALLBLADDER POLYP: ICD-10-CM

## 2021-12-13 PROCEDURE — 76705 ECHO EXAM OF ABDOMEN: CPT | Mod: TC

## 2021-12-13 PROCEDURE — 76705 ECHO EXAM OF ABDOMEN: CPT | Mod: 26,,, | Performed by: RADIOLOGY

## 2021-12-13 PROCEDURE — 76705 US ABDOMEN LIMITED: ICD-10-PCS | Mod: 26,,, | Performed by: RADIOLOGY

## 2021-12-14 ENCOUNTER — PATIENT MESSAGE (OUTPATIENT)
Dept: HEPATOLOGY | Facility: CLINIC | Age: 46
End: 2021-12-14
Payer: COMMERCIAL

## 2022-03-21 ENCOUNTER — PATIENT MESSAGE (OUTPATIENT)
Dept: ADMINISTRATIVE | Facility: HOSPITAL | Age: 47
End: 2022-03-21
Payer: COMMERCIAL

## 2022-03-21 DIAGNOSIS — Z12.11 SCREENING FOR COLON CANCER: ICD-10-CM

## 2022-04-12 LAB — HEMOCCULT STL QL IA: NEGATIVE

## 2022-07-05 ENCOUNTER — PATIENT MESSAGE (OUTPATIENT)
Dept: FAMILY MEDICINE | Facility: CLINIC | Age: 47
End: 2022-07-05
Payer: COMMERCIAL

## 2022-07-06 NOTE — TELEPHONE ENCOUNTER
Plenty of fluids and tylenol for body aches and fever. For a prescription medication patient needs to be seen. Patient is outside the range of time when paxlovid should have been started so is no longer a candidate for that treatment.

## 2022-12-02 ENCOUNTER — OFFICE VISIT (OUTPATIENT)
Dept: FAMILY MEDICINE | Facility: CLINIC | Age: 47
End: 2022-12-02
Payer: COMMERCIAL

## 2022-12-02 VITALS
SYSTOLIC BLOOD PRESSURE: 124 MMHG | BODY MASS INDEX: 22.04 KG/M2 | DIASTOLIC BLOOD PRESSURE: 78 MMHG | RESPIRATION RATE: 18 BRPM | OXYGEN SATURATION: 99 % | HEIGHT: 71 IN | HEART RATE: 79 BPM | WEIGHT: 157.44 LBS | TEMPERATURE: 98 F

## 2022-12-02 DIAGNOSIS — E78.00 PURE HYPERCHOLESTEROLEMIA: Primary | ICD-10-CM

## 2022-12-02 DIAGNOSIS — B18.1 CHRONIC HEPATITIS B: ICD-10-CM

## 2022-12-02 PROCEDURE — 3008F PR BODY MASS INDEX (BMI) DOCUMENTED: ICD-10-PCS | Mod: CPTII,S$GLB,, | Performed by: FAMILY MEDICINE

## 2022-12-02 PROCEDURE — 3074F SYST BP LT 130 MM HG: CPT | Mod: CPTII,S$GLB,, | Performed by: FAMILY MEDICINE

## 2022-12-02 PROCEDURE — 3008F BODY MASS INDEX DOCD: CPT | Mod: CPTII,S$GLB,, | Performed by: FAMILY MEDICINE

## 2022-12-02 PROCEDURE — 99999 PR PBB SHADOW E&M-EST. PATIENT-LVL IV: ICD-10-PCS | Mod: PBBFAC,,, | Performed by: FAMILY MEDICINE

## 2022-12-02 PROCEDURE — 99999 PR PBB SHADOW E&M-EST. PATIENT-LVL IV: CPT | Mod: PBBFAC,,, | Performed by: FAMILY MEDICINE

## 2022-12-02 PROCEDURE — 3074F PR MOST RECENT SYSTOLIC BLOOD PRESSURE < 130 MM HG: ICD-10-PCS | Mod: CPTII,S$GLB,, | Performed by: FAMILY MEDICINE

## 2022-12-02 PROCEDURE — 1159F MED LIST DOCD IN RCRD: CPT | Mod: CPTII,S$GLB,, | Performed by: FAMILY MEDICINE

## 2022-12-02 PROCEDURE — 99396 PR PREVENTIVE VISIT,EST,40-64: ICD-10-PCS | Mod: S$GLB,,, | Performed by: FAMILY MEDICINE

## 2022-12-02 PROCEDURE — 99396 PREV VISIT EST AGE 40-64: CPT | Mod: S$GLB,,, | Performed by: FAMILY MEDICINE

## 2022-12-02 PROCEDURE — 3078F PR MOST RECENT DIASTOLIC BLOOD PRESSURE < 80 MM HG: ICD-10-PCS | Mod: CPTII,S$GLB,, | Performed by: FAMILY MEDICINE

## 2022-12-02 PROCEDURE — 3078F DIAST BP <80 MM HG: CPT | Mod: CPTII,S$GLB,, | Performed by: FAMILY MEDICINE

## 2022-12-02 PROCEDURE — 1159F PR MEDICATION LIST DOCUMENTED IN MEDICAL RECORD: ICD-10-PCS | Mod: CPTII,S$GLB,, | Performed by: FAMILY MEDICINE

## 2022-12-02 NOTE — PROGRESS NOTES
Subjective:   Patient ID: Mouna Garcia is a 47 y.o. male     Chief Complaint:Annual Exam      Here for checkup  Review of Systems   Constitutional:  Negative for chills and fever.   HENT:  Negative for sore throat and trouble swallowing.    Respiratory:  Negative for cough and shortness of breath.    Cardiovascular:  Negative for chest pain and leg swelling.   Gastrointestinal:  Negative for abdominal distention and abdominal pain.   Genitourinary:  Negative for dysuria and flank pain.   Musculoskeletal:  Negative for arthralgias and back pain.   Skin:  Negative for color change and pallor.   Neurological:  Negative for weakness and headaches.   Psychiatric/Behavioral:  Negative for agitation and confusion.    Past Medical History:   Diagnosis Date    H pylori ulcer      Past Surgical History:   Procedure Laterality Date    LYMPH NODE DISSECTION  1997     right side of neck     Objective:     Vitals:    12/02/22 1453   BP: 124/78   Pulse: 79   Resp: 18   Temp: 98.2 °F (36.8 °C)     Body mass index is 21.95 kg/m².  Physical Exam  Vitals and nursing note reviewed.   Constitutional:       Appearance: He is well-developed.   HENT:      Head: Normocephalic and atraumatic.   Eyes:      General: No scleral icterus.     Conjunctiva/sclera: Conjunctivae normal.   Cardiovascular:      Heart sounds: No murmur heard.  Pulmonary:      Effort: Pulmonary effort is normal. No respiratory distress.   Musculoskeletal:         General: No deformity. Normal range of motion.      Cervical back: Normal range of motion and neck supple.   Skin:     Coloration: Skin is not pale.      Findings: No rash.   Neurological:      Mental Status: He is alert and oriented to person, place, and time.   Psychiatric:         Behavior: Behavior normal.         Thought Content: Thought content normal.         Judgment: Judgment normal.   Assessment:     1. Pure hypercholesterolemia    2. Chronic hepatitis B      Plan:   Pure hypercholesterolemia  -      HEPATITIS B VIRAL DNA, QUANTITATIVE; Future; Expected date: 12/02/2022  -     AFP TUMOR MARKER; Future; Expected date: 12/02/2022  -     Comprehensive Metabolic Panel; Future; Expected date: 12/02/2022  -     CBC Auto Differential; Future; Expected date: 12/02/2022  -     Lipid Panel; Future; Expected date: 12/02/2022  -     Hemoglobin A1C; Future; Expected date: 12/02/2022    Chronic hepatitis B  -     HEPATITIS B VIRAL DNA, QUANTITATIVE; Future; Expected date: 12/02/2022  -     AFP TUMOR MARKER; Future; Expected date: 12/02/2022  -     Comprehensive Metabolic Panel; Future; Expected date: 12/02/2022  -     CBC Auto Differential; Future; Expected date: 12/02/2022  -     Lipid Panel; Future; Expected date: 12/02/2022  -     Hemoglobin A1C; Future; Expected date: 12/02/2022            Established patient with me has been instructed that must see me at least 1 time yearly (every 365 days) for refills of medications. Seeing other providers in this clinic is fine but expectation is to see me yearly.    Petr Narayan MD  12/02/2022    Portions of this note have been dictated with LACIE Freeman

## 2022-12-03 ENCOUNTER — LAB VISIT (OUTPATIENT)
Dept: LAB | Facility: HOSPITAL | Age: 47
End: 2022-12-03
Attending: FAMILY MEDICINE
Payer: COMMERCIAL

## 2022-12-03 DIAGNOSIS — E78.00 PURE HYPERCHOLESTEROLEMIA: ICD-10-CM

## 2022-12-03 DIAGNOSIS — B18.1 CHRONIC HEPATITIS B: ICD-10-CM

## 2022-12-03 LAB
AFP SERPL-MCNC: 3.3 NG/ML (ref 0–8.4)
ALBUMIN SERPL BCP-MCNC: 4.2 G/DL (ref 3.5–5.2)
ALP SERPL-CCNC: 81 U/L (ref 55–135)
ALT SERPL W/O P-5'-P-CCNC: 24 U/L (ref 10–44)
ANION GAP SERPL CALC-SCNC: 7 MMOL/L (ref 8–16)
AST SERPL-CCNC: 19 U/L (ref 10–40)
BASOPHILS # BLD AUTO: 0.02 K/UL (ref 0–0.2)
BASOPHILS NFR BLD: 0.4 % (ref 0–1.9)
BILIRUB SERPL-MCNC: 0.6 MG/DL (ref 0.1–1)
BUN SERPL-MCNC: 12 MG/DL (ref 6–20)
CALCIUM SERPL-MCNC: 9.7 MG/DL (ref 8.7–10.5)
CHLORIDE SERPL-SCNC: 103 MMOL/L (ref 95–110)
CHOLEST SERPL-MCNC: 218 MG/DL (ref 120–199)
CHOLEST/HDLC SERPL: 3.9 {RATIO} (ref 2–5)
CO2 SERPL-SCNC: 27 MMOL/L (ref 23–29)
CREAT SERPL-MCNC: 0.8 MG/DL (ref 0.5–1.4)
DIFFERENTIAL METHOD: NORMAL
EOSINOPHIL # BLD AUTO: 0.1 K/UL (ref 0–0.5)
EOSINOPHIL NFR BLD: 1.2 % (ref 0–8)
ERYTHROCYTE [DISTWIDTH] IN BLOOD BY AUTOMATED COUNT: 13 % (ref 11.5–14.5)
EST. GFR  (NO RACE VARIABLE): >60 ML/MIN/1.73 M^2
ESTIMATED AVG GLUCOSE: 111 MG/DL (ref 68–131)
GLUCOSE SERPL-MCNC: 99 MG/DL (ref 70–110)
HBA1C MFR BLD: 5.5 % (ref 4–5.6)
HCT VFR BLD AUTO: 45.8 % (ref 40–54)
HDLC SERPL-MCNC: 56 MG/DL (ref 40–75)
HDLC SERPL: 25.7 % (ref 20–50)
HGB BLD-MCNC: 14.7 G/DL (ref 14–18)
IMM GRANULOCYTES # BLD AUTO: 0.01 K/UL (ref 0–0.04)
IMM GRANULOCYTES NFR BLD AUTO: 0.2 % (ref 0–0.5)
LDLC SERPL CALC-MCNC: 145.8 MG/DL (ref 63–159)
LYMPHOCYTES # BLD AUTO: 1.9 K/UL (ref 1–4.8)
LYMPHOCYTES NFR BLD: 38.4 % (ref 18–48)
MCH RBC QN AUTO: 29.6 PG (ref 27–31)
MCHC RBC AUTO-ENTMCNC: 32.1 G/DL (ref 32–36)
MCV RBC AUTO: 92 FL (ref 82–98)
MONOCYTES # BLD AUTO: 0.3 K/UL (ref 0.3–1)
MONOCYTES NFR BLD: 6.2 % (ref 4–15)
NEUTROPHILS # BLD AUTO: 2.7 K/UL (ref 1.8–7.7)
NEUTROPHILS NFR BLD: 53.6 % (ref 38–73)
NONHDLC SERPL-MCNC: 162 MG/DL
NRBC BLD-RTO: 0 /100 WBC
PLATELET # BLD AUTO: 218 K/UL (ref 150–450)
PMV BLD AUTO: 9.7 FL (ref 9.2–12.9)
POTASSIUM SERPL-SCNC: 4.3 MMOL/L (ref 3.5–5.1)
PROT SERPL-MCNC: 7.3 G/DL (ref 6–8.4)
RBC # BLD AUTO: 4.96 M/UL (ref 4.6–6.2)
SODIUM SERPL-SCNC: 137 MMOL/L (ref 136–145)
TRIGL SERPL-MCNC: 81 MG/DL (ref 30–150)
WBC # BLD AUTO: 4.98 K/UL (ref 3.9–12.7)

## 2022-12-03 PROCEDURE — 36415 COLL VENOUS BLD VENIPUNCTURE: CPT | Mod: PO | Performed by: FAMILY MEDICINE

## 2022-12-03 PROCEDURE — 83036 HEMOGLOBIN GLYCOSYLATED A1C: CPT | Performed by: FAMILY MEDICINE

## 2022-12-03 PROCEDURE — 85025 COMPLETE CBC W/AUTO DIFF WBC: CPT | Performed by: FAMILY MEDICINE

## 2022-12-03 PROCEDURE — 80053 COMPREHEN METABOLIC PANEL: CPT | Performed by: FAMILY MEDICINE

## 2022-12-03 PROCEDURE — 87517 HEPATITIS B DNA QUANT: CPT | Performed by: FAMILY MEDICINE

## 2022-12-03 PROCEDURE — 80061 LIPID PANEL: CPT | Performed by: FAMILY MEDICINE

## 2022-12-03 PROCEDURE — 82105 ALPHA-FETOPROTEIN SERUM: CPT | Performed by: FAMILY MEDICINE

## 2022-12-08 LAB
HBV DNA SERPL NAA+PROBE-ACNC: ABNORMAL IU/ML
HBV DNA SERPL NAA+PROBE-LOG IU: 3.13 LOG (10) IU/ML
HBV DNA SERPL QL NAA+PROBE: DETECTED

## 2023-01-05 ENCOUNTER — OFFICE VISIT (OUTPATIENT)
Dept: HEPATOLOGY | Facility: CLINIC | Age: 48
End: 2023-01-05
Payer: COMMERCIAL

## 2023-01-05 DIAGNOSIS — K82.4 GALLBLADDER POLYP: Primary | ICD-10-CM

## 2023-01-05 DIAGNOSIS — B18.1 CHRONIC HEPATITIS B: ICD-10-CM

## 2023-01-05 PROCEDURE — 99213 OFFICE O/P EST LOW 20 MIN: CPT | Mod: 95,,, | Performed by: INTERNAL MEDICINE

## 2023-01-05 PROCEDURE — 99213 PR OFFICE/OUTPT VISIT, EST, LEVL III, 20-29 MIN: ICD-10-PCS | Mod: 95,,, | Performed by: INTERNAL MEDICINE

## 2023-01-05 NOTE — PROGRESS NOTES
[facility] VideoVisit Note    Reason for Consult:  The primary encounter diagnosis was Gallbladder polyp. A diagnosis of Chronic hepatitis B was also pertinent to this visit.    PCP: Petr Narayan       THIS IS A VIDEO VISIT, THEREFORE, SOME ELEMENTS OF THE PHYSICAL EXAM, SUCH AS VITAL SIGNS, HEART SOUNDS OR BREATH SOUNDS ARE NOT INCLUDED.  ANY SYMPTOMS OR SIGNS THAT WERE VISUALIZED OR STATED BY THE PATIENT MAY BE INCLUDED IN THIS NOTE..         Interval history:  -  Liver mass found on u/s abd and follow-up MRI confirmed mass to be hemangioma, has shown minimal increase in size.  Last was in 12/13/2021.   -  Chronic hepatitis B also remains stable with normal transaminases and HBV DNA 1,300 IU/mL.  -  GB polyps - stable.        -  Patient feels fine.  Has no swelling or pain.  Wt stable.    US 12/13/2021:   1. Unchanged gallbladder polyps.  2. Liver lesion is indeterminate by ultrasound, but was previously characterized as a hemangioma.  Size is unchanged.    U/S needs to be done now  AFP was 3.3        Us 3/25/19:  The liver is normal in size and overall echogenicity.  A hemangioma measuring 3.8 x 3.2 x 2.3 cm is again identified within the lateral segment of the left hepatic lobe which remains unchanged from previous studies.  Approximately 4 gallbladder polyps are present, the largest measuring 6 mm are unchanged.  The bile ducts are not dilated.  Common bile duct diameter is 2.7 mm.  Doppler of the main portal vein confirms a normal waveform and direction of flow.  The spleen and pancreas are normal in size and appearance.  No ascites is present.       MRI 5/29/17:  Posteriorly in the medial segment of the left lobe of the liver corresponding to the ultrasound findings there is 2.7 x 2.7 x 1.9 cm mass typical of a hemangioma.  It's signal is that of blood pool, and there is a bright peripheral nodular enhancement on the early arterial phase.  Liver is otherwise unremarkable in appearance  and spleen, pancreas, adrenal glands, kidneys show no abnormality.      MRI 17:   The liver is normal in size and contour.  Within segment 4A, a lobulated 2.5 cm moderately T2 hyperintense mass is again identified.  The mass demonstrates peripheral nodular enhancement, with progressive centripetal filling on later phase imaging which persists into the delayed phase.  Findings are compatible with a hemangioma.    AFP 3.0, HBV DNA 1150 IU/mL. AST and ALT normal 22, 21.     Chronic hep B is HBeAg negative, HBeAb positive, with HBV DNA of 4,340 IU/mL on 17.  Thus, this is pre-core mutant/core-promoter mutation hep B.  Will need life-long monitoring for flares and HCC.     Patient remains asymptomatic.      HPI:  This is a 47 y.o. male here for evaluation of:  HBsAg positive, HBcore Ab negative. No HBV DNA available. Transaminases normal 23 each, all LFTs normal. Creatinine normal.  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      Just found out of HBsAg positive when donated blood in approx. 2017.  Had HBcAb IgM negative, HAVAb IgM neg, HCV Ab negative.      Patient does not know if mom has hep B, dad is  from brain aneurysm.  Has 3 sisters and 2 brothers from the same mother, not known if any of them have hep B positive result.      Completely asymptomatic.     Elevated liver enzymes: No  Abnormal imaging: liver mass, consistent with hemangioma.  Cirrhosis: No  Hepatitis C: No  Hepatitis B: Yes  Fatty liver: No  Encephalopathy: No  Post-hospital discharge: No  Symptoms: none    Primary hepatic manifestations:  Fatigue:No  Edema:No  Ascites:No  Encephalopathy:No  Abdominal pain:No  GI bleeds: No  Pruritus:No  Weight Changes:No  Changes in Bowel habits: No  Muscle cramps:No    Risk factors for liver disease:  No jaundice  No transfusions  No IVDU  Did not snort cocaine or similar agents  Did not live with anyone with hepatitis B or C  Sexual partner not tested  No hepatotoxic  medications  No exposure to industrial toxins  Alcohol: None      ROS:  Constitutional: No fevers, chills, weight changes, fatigue  ENT: No allergies, nosebleeds,   CV: No chest pain  Pulm: No cough, shortness of breath  Ophtho: No vision changes  GI/Liver: see HPI  Derm: No rash, itching  Heme: No swollen glands, bruising  MSK: No joint pains, joint swelling  : No dysuria, hematuria, decrease in urine output  Endo: No hot or cold intolerance  Neuro: No confusion, disorientation, difficulty with sleep, memory, concentration, syncope, seizure  Psych: No anxiety, depression    Medical History:  has a past medical history of H pylori ulcer.    Surgical History:  has a past surgical history that includes Lymph node dissection (1997).    Family History: family history is not on file..     Social History:  reports that he has never smoked. He has never used smokeless tobacco. He reports current alcohol use. He reports that he does not use drugs.    Review of patient's allergies indicates:   Allergen Reactions    No known drug allergies        Current Outpatient Medications   Medication Sig    MULTIVITAMIN ORAL Take by mouth Daily.     Current Facility-Administered Medications   Medication    dexamethasone injection 8 mg       Objective Findings:    Vital Signs: due to Video Visit, no vitals can be taken for this visit.   There were no vitals taken for this visit.  There is no height or weight on file to calculate BMI.   Patient weighs himself at home, wt between 155-160 lb.      Physical Exam:  General Appearance: Well appearing in no acute distress  Head:   Normocephalic, without obvious abnormality  Eyes:    No scleral icterus, EOMI  ENT: not done due to video visit.   Lungs: not done due to video visit.  Heart:  not done due to video visit.  Abdomen: Based on patient's self exam, abd is Soft, non tender, non distended.   Extremities: no cyanosis, and (based on patient's self-exam) no edema  Skin: No rash  Neurologic:  alert, oriented x 3. No asterixis      Labs:  Lab Results   Component Value Date    WBC 4.98 12/03/2022    HGB 14.7 12/03/2022    HCT 45.8 12/03/2022     12/03/2022    CHOL 218 (H) 12/03/2022    TRIG 81 12/03/2022    HDL 56 12/03/2022    INR 0.9 12/18/2020    CREATININE 0.8 12/03/2022    BUN 12 12/03/2022    BILITOT 0.6 12/03/2022    ALT 24 12/03/2022    AST 19 12/03/2022    ALKPHOS 81 12/03/2022     12/03/2022    K 4.3 12/03/2022     12/03/2022    CO2 27 12/03/2022    TSH 1.419 04/02/2018    HGBA1C 5.5 12/03/2022    AFP 3.3 12/03/2022       Imaging:     Us 3/25/19:  The liver is normal in size and overall echogenicity.  A hemangioma measuring 3.8 x 3.2 x 2.3 cm is again identified within the lateral segment of the left hepatic lobe which remains unchanged from previous studies.  Approximately 4 gallbladder polyps are present, the largest measuring 6 mm are unchanged.  The bile ducts are not dilated.  Common bile duct diameter is 2.7 mm.  Doppler of the main portal vein confirms a normal waveform and direction of flow.  The spleen and pancreas are normal in size and appearance.  No ascites is present.     MRI 5/29/17:  Posteriorly in the medial segment of the left lobe of the liver corresponding to the ultrasound findings there is 2.7 x 2.7 x 1.9 cm mass typical of a hemangioma.  It's signal is that of blood pool, and there is a bright peripheral nodular enhancement on the early arterial phase.  Liver is otherwise unremarkable in appearance and spleen, pancreas, adrenal glands, kidneys show no abnormality.      US 5/29/20:  Liver: The liver is normal in size and echotexture measuring 14 cm in greatest longitudinal dimension.  There is a stable 3.6 x 2.0 x 3.5 cm hemangioma in the lateral segment of the left lobe of the liver.  There is no intrahepatic biliary ductal dilatation.  The hepatorenal index is normal at 0.92.  There is normal directional flow in the main portal vein.         Endoscopy:     None    Assessment:  1. Gallbladder polyp    2. Chronic hepatitis B         -  Hepatic hemangioma:  Liver lesion left lobe, . There is a 3.5 cm hyperechoic lesion in the left lobe.  Has mild decrease in size over time.  GB has polyps, therefore will monitor with u/s abd every 6 months.      -  Chronic hep B: is HBeAg negative, HBeAb positive, with HBV DNA of 1,300 IU/mL in 12/2022.  Thus, this is pre-core mutant/core-promoter mutation hep B.  Currently, HBV DNA <2000 IU/mL, transaminases remain completely normal, therefore, will not start anti-hep B antiviral therapy.  But will do life-long monitoring for flares of hepatitis with CMP and HBV DNA q 6 months.     -  HCC Surveillance:  Due to chronic hep B and being male of  origin, will need life-long monitoring for HCC, starting age 40, currently 44.  Will get AFP every 6 months and U/S every 12 months, and mRI if change noted in the liver mass or new lesions appear.       -  GB polyps, 4 in number, unchanged in size or number, will monitor with u/s every 6 months     Recommendations:  -  CMP, HBV DNA quant, every 12 months - next is 12/2023.   -  AFP every 6 months, next due in 6/2023.   -  U/S abd limited every 6 months starting Now.     -  Return in 1 yr - [facility]    No follow-ups on file.      Order summary:  Orders Placed This Encounter   Procedures    US Abdomen Limited    AFP Tumor Marker       Thank you so much for allowing me to participate in the care of Mouna Encarnacion Samira Alvarado MD

## 2023-01-05 NOTE — PATIENT INSTRUCTIONS
Recommendations:  -  CMP, HBV DNA quant, every 12 months - next is 12/2023.   -  AFP every 6 months, next due in 6/2023.   -  U/S abd limited every 6 months starting Now.     -  Return in 1 yr - [facility]

## 2023-01-19 ENCOUNTER — TELEPHONE (OUTPATIENT)
Dept: HEPATOLOGY | Facility: CLINIC | Age: 48
End: 2023-01-19
Payer: COMMERCIAL

## 2023-01-19 NOTE — TELEPHONE ENCOUNTER
----- Message from Griselda Alvarado MD sent at 1/5/2023  4:58 PM CST -----  Recommendations:  -  CMP, HBV DNA quant, every 12 months - next is 12/2023.   -  AFP every 6 months, next due in 6/2023.   -  U/S abd limited every 6 months starting Now.     -  Return in 1 yr - Richard

## 2023-01-19 NOTE — TELEPHONE ENCOUNTER
-  CMP, HBV DNA quant, every 12 months - next is 12/2023.   -  AFP every 6 months, next due in 6/2023.   -  U/S abd limited every 6 months starting Now.     -  Return in 1 yr - [facility]Visit .    All appts schd and mailed. Pt notified via phone. 12mth recall placed. TRE BUTLER

## 2023-01-28 ENCOUNTER — HOSPITAL ENCOUNTER (OUTPATIENT)
Dept: RADIOLOGY | Facility: HOSPITAL | Age: 48
Discharge: HOME | End: 2023-01-28
Attending: INTERNAL MEDICINE
Payer: COMMERCIAL

## 2023-01-28 DIAGNOSIS — K82.4 GALLBLADDER POLYP: ICD-10-CM

## 2023-01-28 DIAGNOSIS — B18.1 CHRONIC HEPATITIS B: ICD-10-CM

## 2023-01-28 PROCEDURE — 76705 US ABDOMEN LIMITED: ICD-10-PCS | Mod: 26,,, | Performed by: RADIOLOGY

## 2023-01-28 PROCEDURE — 76705 ECHO EXAM OF ABDOMEN: CPT | Mod: TC

## 2023-01-28 PROCEDURE — 76705 ECHO EXAM OF ABDOMEN: CPT | Mod: 26,,, | Performed by: RADIOLOGY

## 2023-02-01 ENCOUNTER — PATIENT MESSAGE (OUTPATIENT)
Dept: HEPATOLOGY | Facility: CLINIC | Age: 48
End: 2023-02-01
Payer: COMMERCIAL

## 2023-03-22 ENCOUNTER — PATIENT MESSAGE (OUTPATIENT)
Dept: ADMINISTRATIVE | Facility: HOSPITAL | Age: 48
End: 2023-03-22
Payer: COMMERCIAL

## 2023-03-23 ENCOUNTER — PATIENT MESSAGE (OUTPATIENT)
Dept: FAMILY MEDICINE | Facility: CLINIC | Age: 48
End: 2023-03-23
Payer: COMMERCIAL

## 2023-03-23 DIAGNOSIS — Z12.11 SCREENING FOR COLON CANCER: Primary | ICD-10-CM

## 2023-04-11 ENCOUNTER — PATIENT MESSAGE (OUTPATIENT)
Dept: ADMINISTRATIVE | Facility: HOSPITAL | Age: 48
End: 2023-04-11
Payer: COMMERCIAL

## 2023-04-26 ENCOUNTER — PATIENT MESSAGE (OUTPATIENT)
Dept: GASTROENTEROLOGY | Facility: CLINIC | Age: 48
End: 2023-04-26
Payer: COMMERCIAL

## 2023-04-26 DIAGNOSIS — Z12.11 SCREENING FOR COLON CANCER: Primary | ICD-10-CM

## 2023-04-27 ENCOUNTER — PATIENT MESSAGE (OUTPATIENT)
Dept: GASTROENTEROLOGY | Facility: CLINIC | Age: 48
End: 2023-04-27
Payer: COMMERCIAL

## 2023-06-19 ENCOUNTER — HOSPITAL ENCOUNTER (OUTPATIENT)
Dept: RADIOLOGY | Facility: HOSPITAL | Age: 48
Discharge: HOME | End: 2023-06-19
Attending: INTERNAL MEDICINE
Payer: COMMERCIAL

## 2023-06-19 DIAGNOSIS — B18.1 CHRONIC HEPATITIS B: ICD-10-CM

## 2023-06-19 DIAGNOSIS — K82.4 GALLBLADDER POLYP: ICD-10-CM

## 2023-06-19 PROCEDURE — 76705 US ABDOMEN LIMITED: ICD-10-PCS | Mod: 26,,, | Performed by: RADIOLOGY

## 2023-06-19 PROCEDURE — 76705 ECHO EXAM OF ABDOMEN: CPT | Mod: TC

## 2023-06-19 PROCEDURE — 76705 ECHO EXAM OF ABDOMEN: CPT | Mod: 26,,, | Performed by: RADIOLOGY

## 2023-06-23 ENCOUNTER — PATIENT MESSAGE (OUTPATIENT)
Dept: HEPATOLOGY | Facility: CLINIC | Age: 48
End: 2023-06-23
Payer: COMMERCIAL

## 2023-06-23 DIAGNOSIS — D18.03 LIVER HEMANGIOMA: Primary | ICD-10-CM

## 2023-07-14 ENCOUNTER — HOSPITAL ENCOUNTER (OUTPATIENT)
Facility: HOSPITAL | Age: 48
Discharge: HOME | End: 2023-07-14
Attending: INTERNAL MEDICINE | Admitting: INTERNAL MEDICINE
Payer: COMMERCIAL

## 2023-07-14 ENCOUNTER — ANESTHESIA EVENT (OUTPATIENT)
Dept: ENDOSCOPY | Facility: HOSPITAL | Age: 48
End: 2023-07-14
Payer: COMMERCIAL

## 2023-07-14 ENCOUNTER — ANESTHESIA (OUTPATIENT)
Dept: ENDOSCOPY | Facility: HOSPITAL | Age: 48
End: 2023-07-14
Payer: COMMERCIAL

## 2023-07-14 DIAGNOSIS — Z13.9 SCREENING DUE: ICD-10-CM

## 2023-07-14 PROCEDURE — D9220A PRA ANESTHESIA: ICD-10-PCS | Mod: 33,ANES,, | Performed by: ANESTHESIOLOGY

## 2023-07-14 PROCEDURE — D9220A PRA ANESTHESIA: Mod: 33,CRNA,, | Performed by: NURSE ANESTHETIST, CERTIFIED REGISTERED

## 2023-07-14 PROCEDURE — 37000009 HC ANESTHESIA EA ADD 15 MINS: Performed by: INTERNAL MEDICINE

## 2023-07-14 PROCEDURE — D9220A PRA ANESTHESIA: ICD-10-PCS | Mod: 33,CRNA,, | Performed by: NURSE ANESTHETIST, CERTIFIED REGISTERED

## 2023-07-14 PROCEDURE — 45385 PR COLONOSCOPY,REMV LESN,SNARE: ICD-10-PCS | Mod: 33,,, | Performed by: INTERNAL MEDICINE

## 2023-07-14 PROCEDURE — 63600175 PHARM REV CODE 636 W HCPCS: Performed by: NURSE ANESTHETIST, CERTIFIED REGISTERED

## 2023-07-14 PROCEDURE — D9220A PRA ANESTHESIA: Mod: 33,ANES,, | Performed by: ANESTHESIOLOGY

## 2023-07-14 PROCEDURE — 25000003 PHARM REV CODE 250: Performed by: NURSE ANESTHETIST, CERTIFIED REGISTERED

## 2023-07-14 PROCEDURE — 45385 COLONOSCOPY W/LESION REMOVAL: CPT | Mod: PT | Performed by: INTERNAL MEDICINE

## 2023-07-14 PROCEDURE — 27201089 HC SNARE, DISP (ANY): Performed by: INTERNAL MEDICINE

## 2023-07-14 PROCEDURE — 45385 COLONOSCOPY W/LESION REMOVAL: CPT | Mod: 33,,, | Performed by: INTERNAL MEDICINE

## 2023-07-14 PROCEDURE — 37000008 HC ANESTHESIA 1ST 15 MINUTES: Performed by: INTERNAL MEDICINE

## 2023-07-14 PROCEDURE — 25000003 PHARM REV CODE 250: Performed by: INTERNAL MEDICINE

## 2023-07-14 RX ORDER — SODIUM CHLORIDE 9 MG/ML
INJECTION, SOLUTION INTRAVENOUS CONTINUOUS
Status: DISCONTINUED | OUTPATIENT
Start: 2023-07-14 | End: 2023-07-14 | Stop reason: HOSPADM

## 2023-07-14 RX ORDER — PROPOFOL 10 MG/ML
VIAL (ML) INTRAVENOUS
Status: DISCONTINUED | OUTPATIENT
Start: 2023-07-14 | End: 2023-07-14

## 2023-07-14 RX ORDER — LIDOCAINE HYDROCHLORIDE 20 MG/ML
INJECTION INTRAVENOUS
Status: DISCONTINUED | OUTPATIENT
Start: 2023-07-14 | End: 2023-07-14

## 2023-07-14 RX ADMIN — LIDOCAINE HYDROCHLORIDE 100 MG: 20 INJECTION, SOLUTION INTRAVENOUS at 09:07

## 2023-07-14 RX ADMIN — SODIUM CHLORIDE: 9 INJECTION, SOLUTION INTRAVENOUS at 08:07

## 2023-07-14 RX ADMIN — PROPOFOL 40 MG: 10 INJECTION, EMULSION INTRAVENOUS at 09:07

## 2023-07-14 RX ADMIN — PROPOFOL 120 MG: 10 INJECTION, EMULSION INTRAVENOUS at 09:07

## 2023-07-14 NOTE — PLAN OF CARE
Vss, aashish po fluids, denies pain, ambulates easily. IV removed, catheter intact. Discharge instructions provided and states understanding. States ready to go home.  Discharged from facility with family per wheelchair.

## 2023-07-14 NOTE — [employer] PATIENT INSTRUCTIONS
Discharge Summary/Instructions after an Endoscopic Procedure  Patient Name: Mouna Hogan  Patient MRN: [id number]  Patient YOB: 1975 Friday, July 14, 2023  Radha Selby MD  Dear patient,  As a result of recent federal legislation (The Federal Cures Act), you may   receive lab or pathology results from your procedure in your [facility]   account before your physician is able to contact you. Your physician or   their representative will relay the results to you with their   recommendations at their soonest availability.  Thank you,  RESTRICTIONS:  During your procedure today, you received medications for sedation.  These   medications may affect your judgment, balance and coordination.  Therefore,   for 24 hours, you have the following restrictions:   - DO NOT drive a car, operate machinery, make legal/financial decisions,   sign important papers or drink alcohol.    ACTIVITY:  Today: no heavy lifting, straining or running due to procedural   sedation/anesthesia.  The following day: return to full activity including work.  DIET:  Eat and drink normally unless instructed otherwise.     TREATMENT FOR COMMON SIDE EFFECTS:  - Mild abdominal pain, nausea, belching, bloating or excessive gas:  rest,   eat lightly and use a heating pad.  - Sore Throat: treat with throat lozenges and/or gargle with warm salt   water.  - Because air was used during the procedure, expelling large amounts of air   from your rectum or belching is normal.  - If a bowel prep was taken, you may not have a bowel movement for 1-3 days.    This is normal.  SYMPTOMS TO WATCH FOR AND REPORT TO YOUR PHYSICIAN:  1. Abdominal pain or bloating, other than gas cramps.  2. Chest pain.  3. Back pain.  4. Signs of infection such as: chills or fever occurring within 24 hours   after the procedure.  5. Rectal bleeding, which would show as bright red, maroon, or black stools.   (A tablespoon of blood from the rectum is not serious,  especially if   hemorrhoids are present.)  6. Vomiting.  7. Weakness or dizziness.  GO DIRECTLY TO THE NEAREST EMERGENCY ROOM IF YOU HAVE ANY OF THE FOLLOWING:      Difficulty breathing              Chills and/or fever over 101 F   Persistent vomiting and/or vomiting blood   Severe abdominal pain   Severe chest pain   Black, tarry stools   Bleeding- more than one tablespoon   Any other symptom or condition that you feel may need urgent attention  Your doctor recommends these additional instructions:  If any biopsies were taken, your doctors clinic will contact you in 1 to 2   weeks with any results.  - Discharge patient to home (with escort).   - Patient has a contact number available for emergencies.  The signs and   symptoms of potential delayed complications were discussed with the   patient.  Return to normal activities tomorrow.  Written discharge   instructions were provided to the patient.   - Resume previous diet.   - Continue present medications.   - Await pathology results.   - Repeat colonoscopy in 5-10 years for surveillance based on pathology   results.   - Return to my office PRN.  For questions, problems or results please call your physician - Radha Selby MD at Work:  [phone].  [facility], EMERGENCY ROOM PHONE NUMBER: [phone]  IF A COMPLICATION OR EMERGENCY SITUATION ARISES AND YOU ARE UNABLE TO REACH   YOUR PHYSICIAN - GO DIRECTLY TO THE EMERGENCY ROOM.  Radha Selby MD  7/14/2023 9:40:26 AM  This report has been verified and signed electronically.  Dear patient,  As a result of recent federal legislation (The Federal Cures Act), you may   receive lab or pathology results from your procedure in your [facility]   account before your physician is able to contact you. Your physician or   their representative will relay the results to you with their   recommendations at their soonest availability.  Thank you,  [employer]ATION

## 2023-07-14 NOTE — TRANSFER OF CARE
"Anesthesia Transfer of Care Note    Patient: Mouna Garcia    Procedure(s) Performed: Procedure(s) (LRB):  COLONOSCOPY (N/A)    Patient location: PACU    Anesthesia Type: general    Transport from OR: Transported from OR on 2-3 L/min O2 by NC with adequate spontaneous ventilation    Post pain: adequate analgesia    Post assessment: no apparent anesthetic complications and tolerated procedure well    Post vital signs: stable    Level of consciousness: awake, alert and oriented    Nausea/Vomiting: no nausea/vomiting    Complications: none    Transfer of care protocol was followed      Last vitals:   Visit Vitals  /74 (BP Location: Left arm, Patient Position: Lying)   Pulse 89   Temp 36.6 °C (97.9 °F) (Skin)   Resp 20   Ht 5' 11" (1.803 m)   Wt 71.2 kg (157 lb)   SpO2 99%   BMI 21.90 kg/m²     "

## 2023-07-14 NOTE — ANESTHESIA POSTPROCEDURE EVALUATION
Anesthesia Post Evaluation    Patient: Mouna Garcia    Procedure(s) Performed: Procedure(s) (LRB):  COLONOSCOPY (N/A)    Final Anesthesia Type: general      Patient location during evaluation: PACU  Patient participation: Yes- Able to Participate  Level of consciousness: awake and alert and oriented  Post-procedure vital signs: reviewed and stable  Pain management: adequate  Airway patency: patent    PONV status at discharge: No PONV  Anesthetic complications: no      Cardiovascular status: blood pressure returned to baseline  Respiratory status: unassisted, spontaneous ventilation and room air  Hydration status: euvolemic  Follow-up not needed.          Vitals Value Taken Time   /76 07/14/23 1005   Temp 36.5 °C (97.7 °F) 07/14/23 1003   Pulse 64 07/14/23 1008   Resp 16 07/14/23 1003   SpO2 100 % 07/14/23 1008   Vitals shown include unvalidated device data.      Event Time   Out of Recovery 10:13:03         Pain/Reid Score: Reid Score: 10 (7/14/2023 10:03 AM)

## 2023-07-14 NOTE — H&P
[facility]    CC: Screening colonoscopy     HPI 48 y.o. male presents for initial routine screening colonoscopy     Past Medical History:   Diagnosis Date    H pylori ulcer        Allergies and Medications reviewed     Review of Systems  General ROS: negative for - chills, fever or weight loss  Cardiovascular ROS: no chest pain or dyspnea on exertion  Gastrointestinal ROS: no blood in stool    Physical Examination  There were no vitals taken for this visit.  General appearance: alert, cooperative, no distress  HENT: Normocephalic, atraumatic, neck symmetrical, no nasal discharge, sclera anicteric   Lungs: clear to auscultation bilaterally, symmetric chest wall expansion bilaterally  Heart: regular rate and rhythm without rub; no displacement of the PMI   Abdomen: soft  Extremities: extremities symmetric; no clubbing, cyanosis, or edema        Labs:  Lab Results   Component Value Date    WBC 4.98 12/03/2022    HGB 14.7 12/03/2022    HCT 45.8 12/03/2022    MCV 92 12/03/2022     12/03/2022             Assessment:   48 y.o. male presents for colonoscopy    Plan:  -Proceed to colonoscopy     MD Janis [facility]  [address], [address]  [place] [address]  Office: [phone]  Fax: [phone]

## 2023-07-17 VITALS
RESPIRATION RATE: 16 BRPM | WEIGHT: 157 LBS | OXYGEN SATURATION: 100 % | HEART RATE: 66 BPM | HEIGHT: 71 IN | TEMPERATURE: 98 F | BODY MASS INDEX: 21.98 KG/M2 | SYSTOLIC BLOOD PRESSURE: 114 MMHG | DIASTOLIC BLOOD PRESSURE: 76 MMHG

## 2023-12-04 ENCOUNTER — OFFICE VISIT (OUTPATIENT)
Dept: FAMILY MEDICINE | Facility: CLINIC | Age: 48
End: 2023-12-04
Payer: COMMERCIAL

## 2023-12-04 VITALS
SYSTOLIC BLOOD PRESSURE: 122 MMHG | OXYGEN SATURATION: 100 % | HEIGHT: 71 IN | BODY MASS INDEX: 22.93 KG/M2 | RESPIRATION RATE: 19 BRPM | WEIGHT: 163.81 LBS | TEMPERATURE: 98 F | HEART RATE: 78 BPM | DIASTOLIC BLOOD PRESSURE: 72 MMHG

## 2023-12-04 DIAGNOSIS — E78.00 PURE HYPERCHOLESTEROLEMIA: Primary | ICD-10-CM

## 2023-12-04 DIAGNOSIS — B18.1 CHRONIC HEPATITIS B: ICD-10-CM

## 2023-12-04 PROCEDURE — 3074F SYST BP LT 130 MM HG: CPT | Mod: CPTII,S$GLB,, | Performed by: FAMILY MEDICINE

## 2023-12-04 PROCEDURE — 3008F PR BODY MASS INDEX (BMI) DOCUMENTED: ICD-10-PCS | Mod: CPTII,S$GLB,, | Performed by: FAMILY MEDICINE

## 2023-12-04 PROCEDURE — 99999 PR PBB SHADOW E&M-EST. PATIENT-LVL III: ICD-10-PCS | Mod: PBBFAC,,, | Performed by: FAMILY MEDICINE

## 2023-12-04 PROCEDURE — 3008F BODY MASS INDEX DOCD: CPT | Mod: CPTII,S$GLB,, | Performed by: FAMILY MEDICINE

## 2023-12-04 PROCEDURE — 3074F PR MOST RECENT SYSTOLIC BLOOD PRESSURE < 130 MM HG: ICD-10-PCS | Mod: CPTII,S$GLB,, | Performed by: FAMILY MEDICINE

## 2023-12-04 PROCEDURE — 99396 PREV VISIT EST AGE 40-64: CPT | Mod: S$GLB,,, | Performed by: FAMILY MEDICINE

## 2023-12-04 PROCEDURE — 99396 PR PREVENTIVE VISIT,EST,40-64: ICD-10-PCS | Mod: S$GLB,,, | Performed by: FAMILY MEDICINE

## 2023-12-04 PROCEDURE — 99999 PR PBB SHADOW E&M-EST. PATIENT-LVL III: CPT | Mod: PBBFAC,,, | Performed by: FAMILY MEDICINE

## 2023-12-04 PROCEDURE — 3078F DIAST BP <80 MM HG: CPT | Mod: CPTII,S$GLB,, | Performed by: FAMILY MEDICINE

## 2023-12-04 PROCEDURE — 1159F MED LIST DOCD IN RCRD: CPT | Mod: CPTII,S$GLB,, | Performed by: FAMILY MEDICINE

## 2023-12-04 PROCEDURE — 3078F PR MOST RECENT DIASTOLIC BLOOD PRESSURE < 80 MM HG: ICD-10-PCS | Mod: CPTII,S$GLB,, | Performed by: FAMILY MEDICINE

## 2023-12-04 PROCEDURE — 1159F PR MEDICATION LIST DOCUMENTED IN MEDICAL RECORD: ICD-10-PCS | Mod: CPTII,S$GLB,, | Performed by: FAMILY MEDICINE

## 2023-12-04 NOTE — PATIENT INSTRUCTIONS
Grant Freire,     If you are due for any health screening(s) below please notify me so we can arrange them to be ordered and scheduled to maintain your health. Most healthy patients complete it. Don't lose out on improving your health.     All of your core healthy metrics are met.

## 2023-12-04 NOTE — PROGRESS NOTES
Subjective:   Patient ID: Mouna Garcia is a 48 y.o. male     Chief Complaint:Annual Exam      Here for checkup      Review of Systems   Constitutional:  Negative for chills and fever.   HENT:  Negative for sore throat and trouble swallowing.    Respiratory:  Negative for cough and shortness of breath.    Cardiovascular:  Negative for chest pain and leg swelling.   Gastrointestinal:  Negative for abdominal distention and abdominal pain.   Genitourinary:  Negative for dysuria and flank pain.   Musculoskeletal:  Negative for arthralgias and back pain.   Skin:  Negative for color change and pallor.   Neurological:  Negative for weakness and headaches.   Psychiatric/Behavioral:  Negative for agitation and confusion.      Past Medical History:   Diagnosis Date    H pylori ulcer      Past Surgical History:   Procedure Laterality Date    COLONOSCOPY N/A 7/14/2023    Procedure: COLONOSCOPY;  Surgeon: Radha Selby MD;  Location: [facility];  Service: Endoscopy;  Laterality: N/A;    LYMPH NODE DISSECTION  1997     right side of neck     Objective:     Vitals:    12/04/23 1440   BP: 122/72   Pulse: 78   Resp: 19   Temp: 98.1 °F (36.7 °C)     Body mass index is 22.85 kg/m².  Physical Exam  Vitals and nursing note reviewed.   Constitutional:       Appearance: He is well-developed.   HENT:      Head: Normocephalic and atraumatic.   Eyes:      General: No scleral icterus.     Conjunctiva/sclera: Conjunctivae normal.   Cardiovascular:      Heart sounds: No murmur heard.  Pulmonary:      Effort: Pulmonary effort is normal. No respiratory distress.   Musculoskeletal:         General: No deformity. Normal range of motion.      Cervical back: Normal range of motion and neck supple.   Skin:     Coloration: Skin is not pale.      Findings: No rash.   Neurological:      Mental Status: He is alert and oriented to person, place, and time.   Psychiatric:         Behavior: Behavior normal.         Thought Content: Thought content  normal.         Judgment: Judgment normal.       Assessment:     1. Pure hypercholesterolemia    2. Chronic hepatitis B      Plan:   Pure hypercholesterolemia  -     HEPATITIS B VIRAL DNA, QUANTITATIVE; Future; Expected date: 12/04/2023  -     AFP TUMOR MARKER; Future; Expected date: 12/04/2023  -     Comprehensive Metabolic Panel; Future; Expected date: 12/04/2023  -     CBC Auto Differential; Future; Expected date: 12/04/2023  -     Hemoglobin A1C; Future; Expected date: 12/04/2023  -     Lipid Panel; Future; Expected date: 12/04/2023    Chronic hepatitis B  -     HEPATITIS B VIRAL DNA, QUANTITATIVE; Future; Expected date: 12/04/2023  -     AFP TUMOR MARKER; Future; Expected date: 12/04/2023  -     Comprehensive Metabolic Panel; Future; Expected date: 12/04/2023  -     CBC Auto Differential; Future; Expected date: 12/04/2023  -     Hemoglobin A1C; Future; Expected date: 12/04/2023  -     Lipid Panel; Future; Expected date: 12/04/2023          Established patient with me has been instructed that must see me at least 1 time yearly (every 365 days) for refills of medications. Seeing other providers in this clinic is fine but expectation is to see me yearly.    Petr Narayan MD  12/04/2023    Portions of this note have been dictated with LACIE Freeman

## 2023-12-08 ENCOUNTER — LAB VISIT (OUTPATIENT)
Dept: LAB | Facility: HOSPITAL | Age: 48
End: 2023-12-08
Attending: FAMILY MEDICINE
Payer: COMMERCIAL

## 2023-12-08 DIAGNOSIS — E78.00 PURE HYPERCHOLESTEROLEMIA: ICD-10-CM

## 2023-12-08 DIAGNOSIS — B18.1 CHRONIC HEPATITIS B: ICD-10-CM

## 2023-12-08 LAB
AFP SERPL-MCNC: 3 NG/ML (ref 0–8.4)
ALBUMIN SERPL BCP-MCNC: 4.2 G/DL (ref 3.5–5.2)
ALP SERPL-CCNC: 55 U/L (ref 55–135)
ALT SERPL W/O P-5'-P-CCNC: 19 U/L (ref 10–44)
ANION GAP SERPL CALC-SCNC: 9 MMOL/L (ref 8–16)
AST SERPL-CCNC: 19 U/L (ref 10–40)
BASOPHILS # BLD AUTO: 0.02 K/UL (ref 0–0.2)
BASOPHILS NFR BLD: 0.4 % (ref 0–1.9)
BILIRUB SERPL-MCNC: 1 MG/DL (ref 0.1–1)
BUN SERPL-MCNC: 8 MG/DL (ref 6–20)
CALCIUM SERPL-MCNC: 9.1 MG/DL (ref 8.7–10.5)
CHLORIDE SERPL-SCNC: 102 MMOL/L (ref 95–110)
CHOLEST SERPL-MCNC: 227 MG/DL (ref 120–199)
CHOLEST/HDLC SERPL: 4.8 {RATIO} (ref 2–5)
CO2 SERPL-SCNC: 27 MMOL/L (ref 23–29)
CREAT SERPL-MCNC: 0.9 MG/DL (ref 0.5–1.4)
DIFFERENTIAL METHOD: NORMAL
EOSINOPHIL # BLD AUTO: 0.1 K/UL (ref 0–0.5)
EOSINOPHIL NFR BLD: 1.1 % (ref 0–8)
ERYTHROCYTE [DISTWIDTH] IN BLOOD BY AUTOMATED COUNT: 12.9 % (ref 11.5–14.5)
EST. GFR  (NO RACE VARIABLE): >60 ML/MIN/1.73 M^2
ESTIMATED AVG GLUCOSE: 114 MG/DL (ref 68–131)
GLUCOSE SERPL-MCNC: 101 MG/DL (ref 70–110)
HBA1C MFR BLD: 5.6 % (ref 4–5.6)
HCT VFR BLD AUTO: 44.8 % (ref 40–54)
HDLC SERPL-MCNC: 47 MG/DL (ref 40–75)
HDLC SERPL: 20.7 % (ref 20–50)
HGB BLD-MCNC: 15 G/DL (ref 14–18)
IMM GRANULOCYTES # BLD AUTO: 0.01 K/UL (ref 0–0.04)
IMM GRANULOCYTES NFR BLD AUTO: 0.2 % (ref 0–0.5)
LDLC SERPL CALC-MCNC: 152 MG/DL (ref 63–159)
LYMPHOCYTES # BLD AUTO: 1.6 K/UL (ref 1–4.8)
LYMPHOCYTES NFR BLD: 34.1 % (ref 18–48)
MCH RBC QN AUTO: 29.9 PG (ref 27–31)
MCHC RBC AUTO-ENTMCNC: 33.5 G/DL (ref 32–36)
MCV RBC AUTO: 89 FL (ref 82–98)
MONOCYTES # BLD AUTO: 0.3 K/UL (ref 0.3–1)
MONOCYTES NFR BLD: 7.1 % (ref 4–15)
NEUTROPHILS # BLD AUTO: 2.7 K/UL (ref 1.8–7.7)
NEUTROPHILS NFR BLD: 57.1 % (ref 38–73)
NONHDLC SERPL-MCNC: 180 MG/DL
NRBC BLD-RTO: 0 /100 WBC
PLATELET # BLD AUTO: 251 K/UL (ref 150–450)
PMV BLD AUTO: 9.2 FL (ref 9.2–12.9)
POTASSIUM SERPL-SCNC: 3.8 MMOL/L (ref 3.5–5.1)
PROT SERPL-MCNC: 7.5 G/DL (ref 6–8.4)
RBC # BLD AUTO: 5.01 M/UL (ref 4.6–6.2)
SODIUM SERPL-SCNC: 138 MMOL/L (ref 136–145)
TRIGL SERPL-MCNC: 140 MG/DL (ref 30–150)
WBC # BLD AUTO: 4.66 K/UL (ref 3.9–12.7)

## 2023-12-08 PROCEDURE — 80061 LIPID PANEL: CPT | Performed by: FAMILY MEDICINE

## 2023-12-08 PROCEDURE — 85025 COMPLETE CBC W/AUTO DIFF WBC: CPT | Performed by: FAMILY MEDICINE

## 2023-12-08 PROCEDURE — 87517 HEPATITIS B DNA QUANT: CPT | Performed by: FAMILY MEDICINE

## 2023-12-08 PROCEDURE — 80053 COMPREHEN METABOLIC PANEL: CPT | Performed by: FAMILY MEDICINE

## 2023-12-08 PROCEDURE — 83036 HEMOGLOBIN GLYCOSYLATED A1C: CPT | Performed by: FAMILY MEDICINE

## 2023-12-08 PROCEDURE — 82105 ALPHA-FETOPROTEIN SERUM: CPT | Performed by: FAMILY MEDICINE

## 2023-12-12 LAB
HEPATITIS B VIRUS DNA: ABNORMAL
HEPATITIS B VIRUS LOG (IU/ML): 2.99 LOGIU/ML
HEPATITIS B VIRUS PCR, QUANT: 986 IU/ML

## 2024-02-20 ENCOUNTER — OFFICE VISIT (OUTPATIENT)
Dept: HEPATOLOGY | Facility: CLINIC | Age: 49
End: 2024-02-20
Payer: COMMERCIAL

## 2024-02-20 DIAGNOSIS — D18.03 LIVER HEMANGIOMA: ICD-10-CM

## 2024-02-20 DIAGNOSIS — B18.1 CHRONIC HEPATITIS B: Primary | ICD-10-CM

## 2024-02-20 PROCEDURE — 99213 OFFICE O/P EST LOW 20 MIN: CPT | Mod: 95,,, | Performed by: INTERNAL MEDICINE

## 2024-02-20 NOTE — Clinical Note
Plan: Repeat U/S abd limited, AFP, CBC, CMP, HBV DNA quant in 1 year (2/8/25).  Return for video visit in 1 year after all results available.

## 2024-02-20 NOTE — PROGRESS NOTES
[facility] VideoVisit Note    Reason for Consult:  There were no encounter diagnoses.    PCP: ePtr Narayan       THIS IS A VIDEO VISIT, THEREFORE, SOME ELEMENTS OF THE PHYSICAL EXAM, SUCH AS VITAL SIGNS, HEART SOUNDS OR BREATH SOUNDS ARE NOT INCLUDED.  ANY SYMPTOMS OR SIGNS THAT WERE VISUALIZED OR STATED BY THE PATIENT MAY BE INCLUDED IN THIS NOTE..         Interval history: 2/20/2024:    Asymptomatic, doing well.  Weight stable.   Last imaging was u/s on 6/19/23:  Homogeneous echotexture. 2.3 x 1.6 x 0.9 cm ill-defined area of slight decreased echogenicity posteriorly in the left lobe corresponding as seen on the prior exam not appearing significantly changed.     AFP 3.00 on 12/8/2023.    HBV  IU/mL, lowest it has been.     LFTs and renal function, are all good.     Plan:  Repeat U/S abd limited, AFP, CBC, CMP, HBV DNA quant in 1 year (2/8/25).   Return for video visit in 1 year after all results available.      1/5/2023:  -  Liver mass found on u/s abd and follow-up MRI confirmed mass to be hemangioma, has shown minimal increase in size.  Last was in 12/13/2021.   -  Chronic hepatitis B also remains stable with normal transaminases and HBV DNA 1,300 IU/mL.  -  GB polyps - stable.        -  Patient feels fine.  Has no swelling or pain.  Wt stable.    US 12/13/2021:   1. Unchanged gallbladder polyps.  2. Liver lesion is indeterminate by ultrasound, but was previously characterized as a hemangioma.  Size is unchanged.    U/S needs to be done now  AFP was 3.3        Us 3/25/19:  The liver is normal in size and overall echogenicity.  A hemangioma measuring 3.8 x 3.2 x 2.3 cm is again identified within the lateral segment of the left hepatic lobe which remains unchanged from previous studies.  Approximately 4 gallbladder polyps are present, the largest measuring 6 mm are unchanged.  The bile ducts are not dilated.  Common bile duct diameter is 2.7 mm.  Doppler of the main portal vein  confirms a normal waveform and direction of flow.  The spleen and pancreas are normal in size and appearance.  No ascites is present.       MRI 17:  Posteriorly in the medial segment of the left lobe of the liver corresponding to the ultrasound findings there is 2.7 x 2.7 x 1.9 cm mass typical of a hemangioma.  It's signal is that of blood pool, and there is a bright peripheral nodular enhancement on the early arterial phase.  Liver is otherwise unremarkable in appearance and spleen, pancreas, adrenal glands, kidneys show no abnormality.      MRI 17:   The liver is normal in size and contour.  Within segment 4A, a lobulated 2.5 cm moderately T2 hyperintense mass is again identified.  The mass demonstrates peripheral nodular enhancement, with progressive centripetal filling on later phase imaging which persists into the delayed phase.  Findings are compatible with a hemangioma.    AFP 3.0, HBV DNA 1150 IU/mL. AST and ALT normal 22, 21.     Chronic hep B is HBeAg negative, HBeAb positive, with HBV DNA of 4,340 IU/mL on 17.  Thus, this is pre-core mutant/core-promoter mutation hep B.  Will need life-long monitoring for flares and HCC.     Patient remains asymptomatic.      HPI:  This is a 48 y.o. male here for evaluation of:  HBsAg positive, HBcore Ab negative. No HBV DNA available. Transaminases normal 23 each, all LFTs normal. Creatinine normal.  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      Just found out of HBsAg positive when donated blood in approx. 2017.  Had HBcAb IgM negative, HAVAb IgM neg, HCV Ab negative.      Patient does not know if mom has hep B, dad is  from brain aneurysm.  Has 3 sisters and 2 brothers from the same mother, not known if any of them have hep B positive result.      Completely asymptomatic.     Elevated liver enzymes: No  Abnormal imaging: liver mass, consistent with hemangioma.  Cirrhosis: No  Hepatitis C: No  Hepatitis B: Yes  Fatty  liver: No  Encephalopathy: No  Post-hospital discharge: No  Symptoms: none    Primary hepatic manifestations:  Fatigue:No  Edema:No  Ascites:No  Encephalopathy:No  Abdominal pain:No  GI bleeds: No  Pruritus:No  Weight Changes:No  Changes in Bowel habits: No  Muscle cramps:No    Risk factors for liver disease:  No jaundice  No transfusions  No IVDU  Did not snort cocaine or similar agents  Did not live with anyone with hepatitis B or C  Sexual partner not tested  No hepatotoxic medications  No exposure to industrial toxins  Alcohol: None      ROS:  Constitutional: No fevers, chills, weight changes, fatigue  ENT: No allergies, nosebleeds,   CV: No chest pain  Pulm: No cough, shortness of breath  Ophtho: No vision changes  GI/Liver: see HPI  Derm: No rash, itching  Heme: No swollen glands, bruising  MSK: No joint pains, joint swelling  : No dysuria, hematuria, decrease in urine output  Endo: No hot or cold intolerance  Neuro: No confusion, disorientation, difficulty with sleep, memory, concentration, syncope, seizure  Psych: No anxiety, depression    Medical History:  has a past medical history of H pylori ulcer.    Surgical History:  has a past surgical history that includes Lymph node dissection (1997) and Colonoscopy (N/A, 7/14/2023).    Family History: family history is not on file..     Social History:  reports that he has never smoked. He has never used smokeless tobacco. He reports current alcohol use. He reports that he does not use drugs.    Review of patient's allergies indicates:   Allergen Reactions    No known drug allergies        Current Outpatient Medications   Medication Sig    MULTIVITAMIN ORAL Take by mouth Daily.     Current Facility-Administered Medications   Medication    dexamethasone injection 8 mg       Objective Findings:    Vital Signs: due to Video Visit, no vitals can be taken for this visit.   There were no vitals taken for this visit.  There is no height or weight on file to calculate  BMI.   Patient weighs himself at home, wt between 155-160 lb.      Physical Exam:  General Appearance: Well appearing in no acute distress  Head:   Normocephalic, without obvious abnormality  Eyes:    No scleral icterus, EOMI  ENT: not done due to video visit.   Lungs: not done due to video visit.  Heart:  not done due to video visit.  Abdomen: Based on patient's self exam, abd is Soft, non tender, non distended.   Extremities: no cyanosis, and (based on patient's self-exam) no edema  Skin: No rash  Neurologic: alert, oriented x 3. No asterixis      Labs:  Lab Results   Component Value Date    WBC 4.66 12/08/2023    HGB 15.0 12/08/2023    HCT 44.8 12/08/2023     12/08/2023    CHOL 227 (H) 12/08/2023    TRIG 140 12/08/2023    HDL 47 12/08/2023    INR 0.9 12/18/2020    CREATININE 0.9 12/08/2023    BUN 8 12/08/2023    BILITOT 1.0 12/08/2023    ALT 19 12/08/2023    AST 19 12/08/2023    ALKPHOS 55 12/08/2023     12/08/2023    K 3.8 12/08/2023     12/08/2023    CO2 27 12/08/2023    TSH 1.419 04/02/2018    HGBA1C 5.6 12/08/2023    AFP 3.0 12/08/2023    AFP 3.1 12/08/2023       Imaging:     Us 3/25/19:  The liver is normal in size and overall echogenicity.  A hemangioma measuring 3.8 x 3.2 x 2.3 cm is again identified within the lateral segment of the left hepatic lobe which remains unchanged from previous studies.  Approximately 4 gallbladder polyps are present, the largest measuring 6 mm are unchanged.  The bile ducts are not dilated.  Common bile duct diameter is 2.7 mm.  Doppler of the main portal vein confirms a normal waveform and direction of flow.  The spleen and pancreas are normal in size and appearance.  No ascites is present.     MRI 5/29/17:  Posteriorly in the medial segment of the left lobe of the liver corresponding to the ultrasound findings there is 2.7 x 2.7 x 1.9 cm mass typical of a hemangioma.  It's signal is that of blood pool, and there is a bright peripheral nodular enhancement on  the early arterial phase.  Liver is otherwise unremarkable in appearance and spleen, pancreas, adrenal glands, kidneys show no abnormality.      US 5/29/20:  Liver: The liver is normal in size and echotexture measuring 14 cm in greatest longitudinal dimension.  There is a stable 3.6 x 2.0 x 3.5 cm hemangioma in the lateral segment of the left lobe of the liver.  There is no intrahepatic biliary ductal dilatation.  The hepatorenal index is normal at 0.92.  There is normal directional flow in the main portal vein.         Endoscopy:    None    Assessment:  No diagnosis found.       -  Hepatic hemangioma:  Liver lesion left lobe, . There is a 3.5 cm hyperechoic lesion in the left lobe.  Has mild decrease in size over time.  GB has polyps, therefore will monitor with u/s abd every 6 months.      -  Chronic hep B: is HBeAg negative, HBeAb positive, with HBV DNA of 1,300 IU/mL in 12/2022.  Thus, this is pre-core mutant/core-promoter mutation hep B.  Currently, HBV DNA <2000 IU/mL, transaminases remain completely normal, therefore, will not start anti-hep B antiviral therapy.  But will do life-long monitoring for flares of hepatitis with CMP and HBV DNA q 6 months.     -  HCC Surveillance:  Due to chronic hep B and being male of  origin, will need life-long monitoring for HCC, starting age 40, currently 44.  Will get AFP every 6 months and U/S every 12 months, and mRI if change noted in the liver mass or new lesions appear.       -  GB polyps, 4 in number, unchanged in size or number, will monitor with u/s every 6 months     Plan:  Repeat U/S abd limited, AFP, CBC, CMP, HBV DNA quant in 1 year (2/8/25).   Return for video visit in 1 year after all results available.         No follow-ups on file.      Order summary:  No orders of the defined types were placed in this encounter.      Thank you so much for allowing me to participate in the care of Mouna Alysha Alvarado MD

## 2024-02-21 ENCOUNTER — TELEPHONE (OUTPATIENT)
Dept: HEPATOLOGY | Facility: CLINIC | Age: 49
End: 2024-02-21
Payer: COMMERCIAL

## 2024-02-21 NOTE — TELEPHONE ENCOUNTER
Repeat U/S abd limited, AFP, CBC, CMP, HBV DNA quant in 1 year (2/8/25).   Return for video visit in 1 year after all results available. Recalls placed. TRE BUTLER

## 2024-02-21 NOTE — TELEPHONE ENCOUNTER
----- Message from Griselda Alvarado MD sent at 2/20/2024  4:34 PM CST -----  Plan:  Repeat U/S abd limited, AFP, CBC, CMP, HBV DNA quant in 1 year (2/8/25).   Return for video visit in 1 year after all results available.

## 2024-06-10 ENCOUNTER — OFFICE VISIT (OUTPATIENT)
Dept: URGENT CARE | Facility: CLINIC | Age: 49
End: 2024-06-10
Payer: COMMERCIAL

## 2024-06-10 VITALS
RESPIRATION RATE: 18 BRPM | HEART RATE: 74 BPM | TEMPERATURE: 98 F | WEIGHT: 181 LBS | OXYGEN SATURATION: 98 % | BODY MASS INDEX: 25.34 KG/M2 | SYSTOLIC BLOOD PRESSURE: 139 MMHG | HEIGHT: 71 IN | DIASTOLIC BLOOD PRESSURE: 82 MMHG

## 2024-06-10 DIAGNOSIS — L28.2 PRURITIC RASH: ICD-10-CM

## 2024-06-10 DIAGNOSIS — L25.9 CONTACT DERMATITIS, UNSPECIFIED CONTACT DERMATITIS TYPE, UNSPECIFIED TRIGGER: ICD-10-CM

## 2024-06-10 DIAGNOSIS — R21 RASH: Primary | ICD-10-CM

## 2024-06-10 PROCEDURE — 99203 OFFICE O/P NEW LOW 30 MIN: CPT | Mod: S$GLB,,, | Performed by: NURSE PRACTITIONER

## 2024-06-10 RX ORDER — TRIAMCINOLONE ACETONIDE 1 MG/G
OINTMENT TOPICAL 2 TIMES DAILY
Qty: 30 G | Refills: 0 | Status: SHIPPED | OUTPATIENT
Start: 2024-06-10 | End: 2024-06-17

## 2024-06-10 NOTE — PROGRESS NOTES
"Subjective:      Patient ID: Mouna Garcia is a 49 y.o. male.    Vitals:  height is 5' 11" (1.803 m) and weight is 82.1 kg (181 lb). His oral temperature is 97.7 °F (36.5 °C). His blood pressure is 139/82 and his pulse is 74. His respiration is 18 and oxygen saturation is 98%.     Chief Complaint: No chief complaint on file.    Pt is present in office due to rash on left forearm. Patient states rash has been present for x 1 week since returning from a beach trip.        Constitution: Negative for chills and fever.   Musculoskeletal:  Negative for pain.   Skin:  Positive for rash (left forearm. left lower leg) and lesion (blisters that pop and drain clear fluid). Negative for erythema.   Allergic/Immunologic: Positive for itching.      Objective:     Physical Exam   Constitutional: He is oriented to person, place, and time.  Non-toxic appearance. He does not appear ill. No distress.   HENT:   Head: Normocephalic and atraumatic.   Nose: Nose normal.   Mouth/Throat: Mucous membranes are moist.   Eyes: Conjunctivae are normal.   Cardiovascular: Normal rate.   Pulmonary/Chest: Effort normal. No respiratory distress.   Abdominal: Normal appearance.   Musculoskeletal:         General: No tenderness.   Neurological: no focal deficit. He is alert and oriented to person, place, and time.   Skin: Skin is warm, dry and rash. Capillary refill takes 2 to 3 seconds. lesion No erythema         Comments: No edema, no induration, no surrounding erythema consistent with infection.  Denies pain/tenderness, reports significant itching left forearm.  Similar area left lower leg, resolving w/o open lesions or blisters.  See photos   Psychiatric: His behavior is normal. Mood normal.   Nursing note and vitals reviewed.              Assessment:     1. Rash    2. Pruritic rash    3. Contact dermatitis, unspecified contact dermatitis type, unspecified trigger        Plan:       Rash    Pruritic rash  -     triamcinolone acetonide 0.1% " (KENALOG) 0.1 % ointment; Apply topically 2 (two) times daily. for 7 days  Dispense: 30 g; Refill: 0    Contact dermatitis, unspecified contact dermatitis type, unspecified trigger

## 2024-06-10 NOTE — PATIENT INSTRUCTIONS
Triamcinolone ointment as prescribed to affected areas  Return to clinic or seek medical re-evaluation if symptoms worsen or fail to improve after 48-72 hours of the above treatment plan

## 2024-08-06 NOTE — TELEPHONE ENCOUNTER
----- Message from Griselda Alvarado MD sent at 12/21/2020  5:26 PM CST -----  Please inform patient results are OK.  
no

## 2024-11-06 ENCOUNTER — TELEPHONE (OUTPATIENT)
Dept: FAMILY MEDICINE | Facility: CLINIC | Age: 49
End: 2024-11-06
Payer: COMMERCIAL

## 2024-11-06 NOTE — TELEPHONE ENCOUNTER
I have attempted to contact pt via phone to book annual appt per pt request. I am unable to reach pt at this time. Left voicemail for patient to call the office back.

## 2024-12-09 ENCOUNTER — HOSPITAL ENCOUNTER (OUTPATIENT)
Dept: RADIOLOGY | Facility: HOSPITAL | Age: 49
Discharge: HOME | End: 2024-12-09
Attending: INTERNAL MEDICINE
Payer: COMMERCIAL

## 2024-12-09 DIAGNOSIS — D18.03 LIVER HEMANGIOMA: ICD-10-CM

## 2024-12-09 PROCEDURE — 76700 US EXAM ABDOM COMPLETE: CPT | Mod: 26,,, | Performed by: RADIOLOGY

## 2024-12-09 PROCEDURE — 76700 US EXAM ABDOM COMPLETE: CPT | Mod: TC

## 2024-12-10 ENCOUNTER — LAB VISIT (OUTPATIENT)
Dept: LAB | Facility: HOSPITAL | Age: 49
End: 2024-12-10
Payer: COMMERCIAL

## 2024-12-10 ENCOUNTER — OFFICE VISIT (OUTPATIENT)
Dept: FAMILY MEDICINE | Facility: CLINIC | Age: 49
End: 2024-12-10
Payer: COMMERCIAL

## 2024-12-10 VITALS
SYSTOLIC BLOOD PRESSURE: 110 MMHG | RESPIRATION RATE: 16 BRPM | WEIGHT: 164.44 LBS | TEMPERATURE: 98 F | HEART RATE: 74 BPM | BODY MASS INDEX: 22.94 KG/M2 | DIASTOLIC BLOOD PRESSURE: 72 MMHG

## 2024-12-10 DIAGNOSIS — B18.1 CHRONIC HEPATITIS B: ICD-10-CM

## 2024-12-10 DIAGNOSIS — Z00.00 ANNUAL PHYSICAL EXAM: ICD-10-CM

## 2024-12-10 DIAGNOSIS — Z00.00 ANNUAL PHYSICAL EXAM: Primary | ICD-10-CM

## 2024-12-10 DIAGNOSIS — E78.00 PURE HYPERCHOLESTEROLEMIA: ICD-10-CM

## 2024-12-10 LAB
AFP SERPL-MCNC: 3.6 NG/ML (ref 0–8.4)
ALBUMIN SERPL BCP-MCNC: 4.2 G/DL (ref 3.5–5.2)
ALP SERPL-CCNC: 71 U/L (ref 40–150)
ALT SERPL W/O P-5'-P-CCNC: 26 U/L (ref 10–44)
ANION GAP SERPL CALC-SCNC: 10 MMOL/L (ref 8–16)
AST SERPL-CCNC: 24 U/L (ref 10–40)
BASOPHILS # BLD AUTO: 0.02 K/UL (ref 0–0.2)
BASOPHILS NFR BLD: 0.4 % (ref 0–1.9)
BILIRUB SERPL-MCNC: 0.9 MG/DL (ref 0.1–1)
BUN SERPL-MCNC: 9 MG/DL (ref 6–20)
CALCIUM SERPL-MCNC: 9.3 MG/DL (ref 8.7–10.5)
CHLORIDE SERPL-SCNC: 104 MMOL/L (ref 95–110)
CHOLEST SERPL-MCNC: 251 MG/DL (ref 120–199)
CHOLEST/HDLC SERPL: 4.8 {RATIO} (ref 2–5)
CO2 SERPL-SCNC: 25 MMOL/L (ref 23–29)
CREAT SERPL-MCNC: 0.9 MG/DL (ref 0.5–1.4)
DIFFERENTIAL METHOD BLD: NORMAL
EOSINOPHIL # BLD AUTO: 0 K/UL (ref 0–0.5)
EOSINOPHIL NFR BLD: 0.8 % (ref 0–8)
ERYTHROCYTE [DISTWIDTH] IN BLOOD BY AUTOMATED COUNT: 13.2 % (ref 11.5–14.5)
EST. GFR  (NO RACE VARIABLE): >60 ML/MIN/1.73 M^2
ESTIMATED AVG GLUCOSE: 108 MG/DL (ref 68–131)
GLUCOSE SERPL-MCNC: 96 MG/DL (ref 70–110)
HBA1C MFR BLD: 5.4 % (ref 4–5.6)
HCT VFR BLD AUTO: 44.8 % (ref 40–54)
HDLC SERPL-MCNC: 52 MG/DL (ref 40–75)
HDLC SERPL: 20.7 % (ref 20–50)
HGB BLD-MCNC: 14.5 G/DL (ref 14–18)
IMM GRANULOCYTES # BLD AUTO: 0.01 K/UL (ref 0–0.04)
IMM GRANULOCYTES NFR BLD AUTO: 0.2 % (ref 0–0.5)
LDLC SERPL CALC-MCNC: 173.4 MG/DL (ref 63–159)
LYMPHOCYTES # BLD AUTO: 1.6 K/UL (ref 1–4.8)
LYMPHOCYTES NFR BLD: 30.8 % (ref 18–48)
MCH RBC QN AUTO: 29.4 PG (ref 27–31)
MCHC RBC AUTO-ENTMCNC: 32.4 G/DL (ref 32–36)
MCV RBC AUTO: 91 FL (ref 82–98)
MONOCYTES # BLD AUTO: 0.4 K/UL (ref 0.3–1)
MONOCYTES NFR BLD: 8.4 % (ref 4–15)
NEUTROPHILS # BLD AUTO: 3.1 K/UL (ref 1.8–7.7)
NEUTROPHILS NFR BLD: 59.4 % (ref 38–73)
NONHDLC SERPL-MCNC: 199 MG/DL
NRBC BLD-RTO: 0 /100 WBC
PLATELET # BLD AUTO: 231 K/UL (ref 150–450)
PMV BLD AUTO: 9.5 FL (ref 9.2–12.9)
POTASSIUM SERPL-SCNC: 4.2 MMOL/L (ref 3.5–5.1)
PROT SERPL-MCNC: 7.6 G/DL (ref 6–8.4)
RBC # BLD AUTO: 4.94 M/UL (ref 4.6–6.2)
SODIUM SERPL-SCNC: 139 MMOL/L (ref 136–145)
TRIGL SERPL-MCNC: 128 MG/DL (ref 30–150)
WBC # BLD AUTO: 5.22 K/UL (ref 3.9–12.7)

## 2024-12-10 PROCEDURE — 87517 HEPATITIS B DNA QUANT: CPT

## 2024-12-10 PROCEDURE — 99999 PR PBB SHADOW E&M-EST. PATIENT-LVL III: CPT | Mod: PBBFAC,,,

## 2024-12-10 PROCEDURE — 82105 ALPHA-FETOPROTEIN SERUM: CPT

## 2024-12-10 PROCEDURE — 3008F BODY MASS INDEX DOCD: CPT | Mod: CPTII,S$GLB,,

## 2024-12-10 PROCEDURE — 3078F DIAST BP <80 MM HG: CPT | Mod: CPTII,S$GLB,,

## 2024-12-10 PROCEDURE — 36415 COLL VENOUS BLD VENIPUNCTURE: CPT | Mod: PO

## 2024-12-10 PROCEDURE — 3074F SYST BP LT 130 MM HG: CPT | Mod: CPTII,S$GLB,,

## 2024-12-10 PROCEDURE — 80053 COMPREHEN METABOLIC PANEL: CPT

## 2024-12-10 PROCEDURE — 80061 LIPID PANEL: CPT

## 2024-12-10 PROCEDURE — 99396 PREV VISIT EST AGE 40-64: CPT | Mod: S$GLB,,,

## 2024-12-10 PROCEDURE — 1159F MED LIST DOCD IN RCRD: CPT | Mod: CPTII,S$GLB,,

## 2024-12-10 PROCEDURE — 83036 HEMOGLOBIN GLYCOSYLATED A1C: CPT

## 2024-12-10 PROCEDURE — 85025 COMPLETE CBC W/AUTO DIFF WBC: CPT

## 2024-12-10 PROCEDURE — 1160F RVW MEDS BY RX/DR IN RCRD: CPT | Mod: CPTII,S$GLB,,

## 2024-12-10 NOTE — PROGRESS NOTES
Subjective:       Patient ID: Mouna Garcia is a 49 y.o. male.    Chief Complaint: annual       Mouna Garcia is a 49 y.o. male with chronic Hep B, liver hemangioma, hyperlipidemia who presents to clinic for annual exam. He is interested in updating labs. He follows with hepatology, Dr. Alvarado. He recently had abdominal ultrasound which revealed the area of concern on the liver had become larger. Recommendation made for repeat MRI for further evaluation. Declines flu vaccine today.        Review of Systems   Constitutional:  Negative for activity change, appetite change and fatigue.   Respiratory:  Negative for cough and shortness of breath.    Cardiovascular:  Negative for chest pain and palpitations.   Gastrointestinal:  Negative for abdominal pain, constipation and diarrhea.   Musculoskeletal:  Negative for arthralgias.   Skin:  Negative for rash.   Neurological:  Negative for dizziness and headaches.         Past Medical History:   Diagnosis Date    H pylori ulcer        Review of patient's allergies indicates:   Allergen Reactions    No known drug allergies          Current Outpatient Medications:     MULTIVITAMIN ORAL, Take by mouth Daily., Disp: , Rfl:   No current facility-administered medications for this visit.    Objective:        Physical Exam  Vitals reviewed.   Constitutional:       General: He is not in acute distress.     Appearance: Normal appearance.   HENT:      Head: Normocephalic and atraumatic.   Eyes:      Conjunctiva/sclera: Conjunctivae normal.   Cardiovascular:      Rate and Rhythm: Normal rate and regular rhythm.      Pulses: Normal pulses.      Heart sounds: Normal heart sounds.   Pulmonary:      Effort: Pulmonary effort is normal.      Breath sounds: Normal breath sounds.   Abdominal:      General: Bowel sounds are normal.      Palpations: Abdomen is soft.      Tenderness: There is no abdominal tenderness. There is no guarding or rebound.   Musculoskeletal:         General: Normal range  of motion.      Cervical back: Normal range of motion.      Right lower leg: No edema.      Left lower leg: No edema.   Skin:     General: Skin is warm and dry.   Neurological:      Mental Status: He is alert and oriented to person, place, and time.   Psychiatric:         Behavior: Behavior normal.           Visit Vitals  /72 (BP Location: Left arm, Patient Position: Sitting)   Pulse 74   Temp 98.2 °F (36.8 °C)   Resp 16   Wt 74.6 kg (164 lb 7.4 oz)   BMI 22.94 kg/m²      Assessment:         1. Annual physical exam    2. Chronic hepatitis B    3. Pure hypercholesterolemia        Plan:         Diagnoses and all orders for this visit:    Annual physical exam  -     CBC Auto Differential; Future  -     Comprehensive Metabolic Panel; Future  -     Lipid Panel; Future  -     HEMOGLOBIN A1C; Future    Chronic hepatitis B  -     HEPATITIS B VIRAL DNA, QUANTITATIVE; Future  -     AFP TUMOR MARKER; Future  -     Following with hepatology for the liver hemangioma. Recommend he follow up for recommendation of the MRI    Pure hypercholesterolemia  -     Lipid Panel; Future     Follow up annually with PCP or sooner if needed.        Family Medicine Physician Assistant     Future Appointments       Date Provider Specialty Appt Notes    12/10/2024  Lab fasting labs    12/16/2025 Petr Narayan MD Family Medicine annual             All of your core healthy metrics are met.       I spent a total of 20 minutes on the day of the visit.This includes face to face time and non-face to face time preparing to see the patient (eg, review of tests), obtaining and/or reviewing separately obtained history, documenting clinical information in the electronic or other health record, independently interpreting results and communicating results to the patient/family/caregiver, or care coordinator.

## 2024-12-12 ENCOUNTER — TELEPHONE (OUTPATIENT)
Dept: HEPATOLOGY | Facility: CLINIC | Age: 49
End: 2024-12-12
Payer: COMMERCIAL

## 2024-12-12 DIAGNOSIS — K76.9 LIVER DISEASE, UNSPECIFIED: Primary | ICD-10-CM

## 2024-12-12 LAB
HBV DNA SERPL NAA+PROBE-ACNC: 1291 IU/ML
HEPATITIS B VIRUS DNA: ABNORMAL
HEPATITIS B VIRUS LOG (IU/ML): 3.11 LOGIU/ML

## 2024-12-12 NOTE — TELEPHONE ENCOUNTER
PLease schedule MRI with , without contrast, using liver mass protocol, to be done at [facility].       Thanks    MACY Alvarado MD

## 2024-12-12 NOTE — TELEPHONE ENCOUNTER
Per Dr Alvarado,  Patient informed the liver lesion is slightly enlarged compared to previous imaging.   Gallbladder polyps also seen, 3 mm in size.    Radiologist recommending MRI of abdomen using liver mass protocol.    MRI appt scheduled.        ----- Message from Griselda Alvarado MD sent at 12/12/2024  5:14 AM CST -----  Pls inform patient;  The liver lesion is slightly enlarged compared to previous imaging. Gallbladder polyps also seen, 3 mm in size.  Radiologist recommending MRI of abdomen using liver mass protocol..  Order placed. Will need to review in IR conf.

## 2024-12-13 ENCOUNTER — TELEPHONE (OUTPATIENT)
Dept: HEPATOLOGY | Facility: CLINIC | Age: 49
End: 2024-12-13
Payer: COMMERCIAL

## 2024-12-13 ENCOUNTER — PATIENT MESSAGE (OUTPATIENT)
Dept: HEPATOLOGY | Facility: CLINIC | Age: 49
End: 2024-12-13
Payer: COMMERCIAL

## 2024-12-13 NOTE — TELEPHONE ENCOUNTER
----- Message from Griselda Alvarado MD sent at 12/13/2024  3:45 AM CST -----  Patient results are OK.

## 2024-12-19 ENCOUNTER — TELEPHONE (OUTPATIENT)
Dept: RADIOLOGY | Facility: HOSPITAL | Age: 49
End: 2024-12-19

## 2024-12-20 ENCOUNTER — HOSPITAL ENCOUNTER (OUTPATIENT)
Dept: RADIOLOGY | Facility: HOSPITAL | Age: 49
Discharge: HOME | End: 2024-12-20
Attending: INTERNAL MEDICINE
Payer: COMMERCIAL

## 2024-12-20 DIAGNOSIS — K76.9 LIVER DISEASE, UNSPECIFIED: ICD-10-CM

## 2024-12-20 PROCEDURE — A9585 GADOBUTROL INJECTION: HCPCS | Performed by: INTERNAL MEDICINE

## 2024-12-20 PROCEDURE — 74183 MRI ABD W/O CNTR FLWD CNTR: CPT | Mod: 26,,, | Performed by: RADIOLOGY

## 2024-12-20 PROCEDURE — 74183 MRI ABD W/O CNTR FLWD CNTR: CPT | Mod: TC

## 2024-12-20 PROCEDURE — 25500020 PHARM REV CODE 255: Performed by: INTERNAL MEDICINE

## 2024-12-20 RX ORDER — GADOBUTROL 604.72 MG/ML
7 INJECTION INTRAVENOUS
Status: COMPLETED | OUTPATIENT
Start: 2024-12-20 | End: 2024-12-20

## 2024-12-20 RX ADMIN — GADOBUTROL 7 ML: 604.72 INJECTION INTRAVENOUS at 06:12

## 2024-12-23 ENCOUNTER — PATIENT MESSAGE (OUTPATIENT)
Dept: HEPATOLOGY | Facility: CLINIC | Age: 49
End: 2024-12-23
Payer: COMMERCIAL

## 2024-12-23 ENCOUNTER — TELEPHONE (OUTPATIENT)
Dept: HEPATOLOGY | Facility: CLINIC | Age: 49
End: 2024-12-23
Payer: COMMERCIAL